# Patient Record
Sex: FEMALE | Race: WHITE | NOT HISPANIC OR LATINO | Employment: PART TIME | URBAN - METROPOLITAN AREA
[De-identification: names, ages, dates, MRNs, and addresses within clinical notes are randomized per-mention and may not be internally consistent; named-entity substitution may affect disease eponyms.]

---

## 2023-09-19 ENCOUNTER — TRANSFERRED RECORDS (OUTPATIENT)
Dept: HEALTH INFORMATION MANAGEMENT | Facility: CLINIC | Age: 48
End: 2023-09-19

## 2023-10-06 ENCOUNTER — TRANSFERRED RECORDS (OUTPATIENT)
Dept: HEALTH INFORMATION MANAGEMENT | Facility: CLINIC | Age: 48
End: 2023-10-06

## 2023-10-20 ENCOUNTER — TRANSFERRED RECORDS (OUTPATIENT)
Dept: HEALTH INFORMATION MANAGEMENT | Facility: CLINIC | Age: 48
End: 2023-10-20

## 2023-10-23 ENCOUNTER — MEDICAL CORRESPONDENCE (OUTPATIENT)
Dept: HEALTH INFORMATION MANAGEMENT | Facility: CLINIC | Age: 48
End: 2023-10-23

## 2023-10-24 ENCOUNTER — TELEPHONE (OUTPATIENT)
Dept: ORTHOPEDICS | Facility: CLINIC | Age: 48
End: 2023-10-24

## 2023-10-24 ENCOUNTER — TELEPHONE (OUTPATIENT)
Dept: ORTHOPEDICS | Facility: CLINIC | Age: 48
End: 2023-10-24
Payer: COMMERCIAL

## 2023-10-24 NOTE — TELEPHONE ENCOUNTER
Patient is having STAT MRI of femur w and w/o done at [facility] Wednesday October 25 at 7pm. Referring provider's nurse will push image early Thursday morning.     Valentina Taylor LPN

## 2023-10-24 NOTE — TELEPHONE ENCOUNTER
[facility]    Phone Message    May a detailed message be left on voicemail: no     Reason for Call: Needs appt for lesion left femur/ Dr Bhargav Grossman @ [facility]/ MRI left knee @ [facility]    MRI was done of the left knee where they saw the lesion. Radiologist is recommending further imaging, but they wanted to see what the Ortho Oncology doctor would recommend.    Call Barb MCFARLAND to schedule the appt [phone]    Action Taken: Message routed to:  [facility]    Travel Screening: Not Applicable

## 2023-10-25 ENCOUNTER — TRANSFERRED RECORDS (OUTPATIENT)
Dept: HEALTH INFORMATION MANAGEMENT | Facility: CLINIC | Age: 48
End: 2023-10-25
Payer: COMMERCIAL

## 2023-10-25 LAB
ABO/RH(D): NORMAL
ANTIBODY SCREEN: NEGATIVE
SPECIMEN EXPIRATION DATE: NORMAL

## 2023-10-25 NOTE — TELEPHONE ENCOUNTER
Action October 24, 2023 9:20 PM MT   Action Taken Sent a request for imaging from [facility]      Action October 26, 2023 9:54 AM MT   Action Taken Imaging from [facility] received and called [facility] for the MRI done yesterday. The rep states that the read is not available, just imaging and we confirmed that the results will not be ready today. Will have to call back another time for the read.      DIAGNOSIS: Left Distal Femur Osseous Lesion   APPOINTMENT DATE: 10/26/2023   NOTES STATUS DETAILS   OFFICE NOTE from referring provider [facility] 10/06/2023 - michelle Grossman MD - [facility]   OFFICE NOTE from other specialist [facility] 04/15/2015 - Anam Hyman MD - [facility]   DISCHARGE REPORT from the ER [facility] 09/19/2023 -  [facility]   MEDICATION LIST Care Everywhere    LABS     MRI PACS [facility]:  10/25/2023 - LT Femur  [facility]:  10/20/2023, 04/03/2015- LT Knee   CT SCAN PACS [facility]:  09/19/2023 - Left Lower Extremity   XRAYS (IMAGES & REPORTS) PACS [facility]:   10/06/2023 - Left Knee  10/06/2023 - Pelvis     
CONSTITUTIONAL: WD,WN. NAD.  Becomes dramatic and yelling when providers nearby.   SKIN: Normal color and turgor. No rash.    HEAD: NC/AT.  EYES: Conjunctiva clear. EOMI. PERRL.    ENT: Airway patent, OP without erythema, tonsillar swelling or exudate; uvula midline without swelling. No evidence of tongue biting.  Nasal mucosa clear, no rhinorrhea.   RESPIRATORY:  Breathing non-labored. No retractions or accessory muscle use.  Lungs CTA bilat.  CARDIOVASCULAR:  RRR, S1S2. No M/R/G.      GI:  Abdomen soft, nontender.    MSK: Neck supple with painless ROM.  No lower extremity edema or calf tenderness.  No joint swelling or ROM limitation.  NEURO: Alert and oriented x 3; CN II-XII grossly intact. Speech clear. 5/5 strength in all extremities.  Normal balance and gait.

## 2023-10-26 ENCOUNTER — ANESTHESIA EVENT (OUTPATIENT)
Dept: SURGERY | Facility: CLINIC | Age: 48
End: 2023-10-26
Payer: COMMERCIAL

## 2023-10-26 ENCOUNTER — ANCILLARY PROCEDURE (OUTPATIENT)
Dept: GENERAL RADIOLOGY | Facility: CLINIC | Age: 48
End: 2023-10-26
Attending: PHYSICIAN ASSISTANT
Payer: COMMERCIAL

## 2023-10-26 ENCOUNTER — PRE VISIT (OUTPATIENT)
Dept: ORTHOPEDICS | Facility: CLINIC | Age: 48
End: 2023-10-26
Payer: COMMERCIAL

## 2023-10-26 ENCOUNTER — LAB (OUTPATIENT)
Dept: LAB | Facility: CLINIC | Age: 48
End: 2023-10-26
Payer: COMMERCIAL

## 2023-10-26 ENCOUNTER — HOSPITAL ENCOUNTER (OUTPATIENT)
Facility: CLINIC | Age: 48
End: 2023-10-26
Attending: ORTHOPAEDIC SURGERY | Admitting: ORTHOPAEDIC SURGERY
Payer: COMMERCIAL

## 2023-10-26 ENCOUNTER — OFFICE VISIT (OUTPATIENT)
Dept: SURGERY | Facility: CLINIC | Age: 48
End: 2023-10-26
Payer: COMMERCIAL

## 2023-10-26 ENCOUNTER — OFFICE VISIT (OUTPATIENT)
Dept: ORTHOPEDICS | Facility: CLINIC | Age: 48
End: 2023-10-26
Payer: COMMERCIAL

## 2023-10-26 VITALS
DIASTOLIC BLOOD PRESSURE: 77 MMHG | SYSTOLIC BLOOD PRESSURE: 119 MMHG | TEMPERATURE: 98 F | RESPIRATION RATE: 16 BRPM | OXYGEN SATURATION: 100 % | BODY MASS INDEX: 32.14 KG/M2 | HEIGHT: 63 IN | HEART RATE: 91 BPM | WEIGHT: 181.4 LBS

## 2023-10-26 DIAGNOSIS — M85.60 BONE CYST: Primary | ICD-10-CM

## 2023-10-26 DIAGNOSIS — M85.60 BONE CYST: ICD-10-CM

## 2023-10-26 DIAGNOSIS — Z01.818 PREOP EXAMINATION: Primary | ICD-10-CM

## 2023-10-26 DIAGNOSIS — I10 PRIMARY HYPERTENSION: ICD-10-CM

## 2023-10-26 LAB
ALBUMIN SERPL BCG-MCNC: 4.1 G/DL (ref 3.5–5.2)
ALP SERPL-CCNC: 110 U/L (ref 35–104)
ALT SERPL W P-5'-P-CCNC: 16 U/L (ref 0–50)
ANION GAP SERPL CALCULATED.3IONS-SCNC: 18 MMOL/L (ref 7–15)
AST SERPL W P-5'-P-CCNC: 25 U/L (ref 0–45)
BASOPHILS # BLD AUTO: 0 10E3/UL (ref 0–0.2)
BASOPHILS NFR BLD AUTO: 0 %
BILIRUB SERPL-MCNC: 0.4 MG/DL
BUN SERPL-MCNC: 14.1 MG/DL (ref 6–20)
CALCIUM SERPL-MCNC: 10.1 MG/DL (ref 8.6–10)
CHLORIDE SERPL-SCNC: 90 MMOL/L (ref 98–107)
CREAT SERPL-MCNC: 0.73 MG/DL (ref 0.51–0.95)
CRP SERPL-MCNC: 75.6 MG/L
DEPRECATED HCO3 PLAS-SCNC: 25 MMOL/L (ref 22–29)
EGFRCR SERPLBLD CKD-EPI 2021: >90 ML/MIN/1.73M2
EOSINOPHIL # BLD AUTO: 0 10E3/UL (ref 0–0.7)
EOSINOPHIL NFR BLD AUTO: 0 %
ERYTHROCYTE [DISTWIDTH] IN BLOOD BY AUTOMATED COUNT: 13.4 % (ref 10–15)
ERYTHROCYTE [SEDIMENTATION RATE] IN BLOOD BY WESTERGREN METHOD: 100 MM/HR (ref 0–20)
GLUCOSE SERPL-MCNC: 95 MG/DL (ref 70–99)
HCT VFR BLD AUTO: 30.2 % (ref 35–47)
HGB BLD-MCNC: 10.2 G/DL (ref 11.7–15.7)
IMM GRANULOCYTES # BLD: 0 10E3/UL
IMM GRANULOCYTES NFR BLD: 0 %
LYMPHOCYTES # BLD AUTO: 1.5 10E3/UL (ref 0.8–5.3)
LYMPHOCYTES NFR BLD AUTO: 19 %
MCH RBC QN AUTO: 30.3 PG (ref 26.5–33)
MCHC RBC AUTO-ENTMCNC: 33.8 G/DL (ref 31.5–36.5)
MCV RBC AUTO: 90 FL (ref 78–100)
MONOCYTES # BLD AUTO: 0.7 10E3/UL (ref 0–1.3)
MONOCYTES NFR BLD AUTO: 9 %
NEUTROPHILS # BLD AUTO: 5.6 10E3/UL (ref 1.6–8.3)
NEUTROPHILS NFR BLD AUTO: 72 %
NRBC # BLD AUTO: 0 10E3/UL
NRBC BLD AUTO-RTO: 0 /100
PLATELET # BLD AUTO: 460 10E3/UL (ref 150–450)
POTASSIUM SERPL-SCNC: 3.3 MMOL/L (ref 3.4–5.3)
PROT SERPL-MCNC: 8.4 G/DL (ref 6.4–8.3)
RBC # BLD AUTO: 3.37 10E6/UL (ref 3.8–5.2)
SODIUM SERPL-SCNC: 133 MMOL/L (ref 135–145)
WBC # BLD AUTO: 7.9 10E3/UL (ref 4–11)

## 2023-10-26 PROCEDURE — 99205 OFFICE O/P NEW HI 60 MIN: CPT | Performed by: PHYSICIAN ASSISTANT

## 2023-10-26 PROCEDURE — 85025 COMPLETE CBC W/AUTO DIFF WBC: CPT | Performed by: PATHOLOGY

## 2023-10-26 PROCEDURE — 86850 RBC ANTIBODY SCREEN: CPT | Performed by: NURSE PRACTITIONER

## 2023-10-26 PROCEDURE — 99000 SPECIMEN HANDLING OFFICE-LAB: CPT | Performed by: PATHOLOGY

## 2023-10-26 PROCEDURE — 80053 COMPREHEN METABOLIC PANEL: CPT | Performed by: PATHOLOGY

## 2023-10-26 PROCEDURE — 71046 X-RAY EXAM CHEST 2 VIEWS: CPT | Mod: GC | Performed by: RADIOLOGY

## 2023-10-26 PROCEDURE — 85652 RBC SED RATE AUTOMATED: CPT | Performed by: PATHOLOGY

## 2023-10-26 PROCEDURE — 86901 BLOOD TYPING SEROLOGIC RH(D): CPT | Performed by: NURSE PRACTITIONER

## 2023-10-26 PROCEDURE — 36415 COLL VENOUS BLD VENIPUNCTURE: CPT | Performed by: PATHOLOGY

## 2023-10-26 PROCEDURE — 83521 IG LIGHT CHAINS FREE EACH: CPT | Mod: 59 | Performed by: PHYSICIAN ASSISTANT

## 2023-10-26 PROCEDURE — 84165 PROTEIN E-PHORESIS SERUM: CPT | Mod: 26 | Performed by: PATHOLOGY

## 2023-10-26 PROCEDURE — 99204 OFFICE O/P NEW MOD 45 MIN: CPT | Performed by: NURSE PRACTITIONER

## 2023-10-26 PROCEDURE — 84165 PROTEIN E-PHORESIS SERUM: CPT | Mod: TC | Performed by: PATHOLOGY

## 2023-10-26 PROCEDURE — 84155 ASSAY OF PROTEIN SERUM: CPT | Performed by: PHYSICIAN ASSISTANT

## 2023-10-26 PROCEDURE — 86140 C-REACTIVE PROTEIN: CPT | Performed by: PATHOLOGY

## 2023-10-26 RX ORDER — LISINOPRIL/HYDROCHLOROTHIAZIDE 10-12.5 MG
1 TABLET ORAL EVERY MORNING
COMMUNITY
Start: 2023-06-21

## 2023-10-26 RX ORDER — ACETAMINOPHEN 500 MG
500-1000 TABLET ORAL EVERY 6 HOURS PRN
COMMUNITY

## 2023-10-26 RX ORDER — TRAZODONE HYDROCHLORIDE 50 MG/1
50 TABLET, FILM COATED ORAL AT BEDTIME
COMMUNITY
Start: 2023-10-25

## 2023-10-26 RX ORDER — ONDANSETRON 4 MG/1
4 TABLET, ORALLY DISINTEGRATING ORAL EVERY 8 HOURS PRN
COMMUNITY
Start: 2023-10-25

## 2023-10-26 RX ORDER — NICOTINE POLACRILEX 4 MG/1
20 GUM, CHEWING ORAL
COMMUNITY
Start: 2023-10-25

## 2023-10-26 RX ORDER — IBUPROFEN 200 MG
200 TABLET ORAL EVERY 4 HOURS PRN
COMMUNITY

## 2023-10-26 RX ORDER — OXYCODONE HYDROCHLORIDE 5 MG/1
5 TABLET ORAL EVERY 6 HOURS PRN
COMMUNITY
Start: 2023-10-25

## 2023-10-26 ASSESSMENT — PAIN SCALES - GENERAL: PAINLEVEL: SEVERE PAIN (6)

## 2023-10-26 NOTE — PATIENT INSTRUCTIONS
Preparing for Your Surgery      Name:  Tacho Lazar   MRN:  [id number]   :  1975   Today's Date:  10/26/2023       Arriving for surgery:  Surgery date:  10/30/2023  Arrival time:  9:00 am    Please come to:     Please come to:      [facility] [address]  [address] [place]  [address]  The Green Ramp for patients and visitors is located beneath the [facility]. The parking facility entrance is at the intersection of [address] [place] [address] and [address]. Patients and visitors who self-park will receive the reduced hospital parking rate (no ticket validation needed).  [employer] parking, located at the [facility] main entrance on [address] [place] [address], is available Monday - Friday from 7 am to 3:30 pm.  Discounted parking pass options can be purchased from  attendants during business hours.  -Check in at the security desk in the [facility] ([facility]) Lobby. They will direct you to the correct elevators.  -Proceed to the 3rd floor, check in at the Adult Surgery Waiting Lounge. [phone]  If you are in need of directions, a wheelchair or escort please stop at the Information Desk in the lobby.  Inform the information person that you are here for surgery; a wheelchair and escort to [address] will be provided.   An escort to the Adult Surgery Waiting Lounge will be provided.    What can I eat or drink?  -  You may eat and drink normally up to 8 hours prior to arrival time. (Until Midnight)  -  You may have clear liquids until 2 hours prior to arrival time. (Until 7 am)    Examples of clear liquids:  Water  Clear broth  Juices (apple, white grape, white cranberry  and cider) without pulp  Noncarbonated, powder based beverages  (lemonade and Rupesh-Aid)  Sodas (Sprite, 7-Up, ginger ale and seltzer)  Coffee or tea (without milk or cream)  Gatorade    -  No Alcohol or cannabis  products for at least 24 hours before surgery.     Which medicines can I take?    Hold Aspirin for 7 days before surgery.   Hold Multivitamins for 7 days before surgery.  Hold Supplements for 7 days before surgery.  Hold Ibuprofen (Advil, Motrin) for 3 day(s) before surgery--unless otherwise directed by surgeon.  Hold Naproxen (Aleve) for 4 days before surgery.    -  DO NOT take these medications the day of surgery:  Lisinopril       -  PLEASE TAKE these medications the day of surgery:  Omeprazole  Acetaminophen (Tylenol) if needed  Ondansetron if needed  Oxycodone if needed       How do I prepare myself?  - Please take 2 showers (one the night prior to surgery and one the morning of surgery) using Scrubcare or Hibiclens soap.    Use this soap only from the neck to your toes.     Leave the soap on your skin for one minute--then rinse thoroughly.      You may use your own shampoo and conditioner. No other hair products.   - Please remove all jewelry and body piercings.  - No lotions, deodorants or fragrance.  - No makeup or fingernail polish.   - Bring your ID and insurance card.    -If you have a Deep Brain Stimulator, Spinal Cord Stimulator, or any Neuro Stimulator device---you must bring the remote control to the hospital.      ALL PATIENTS GOING HOME THE SAME DAY OF SURGERY ARE REQUIRED TO HAVE A RESPONSIBLE ADULT TO DRIVE AND BE IN ATTENDANCE WITH THEM FOR 24 HOURS FOLLOWING SURGERY.    Covid testing policy as of 12/06/2022  Your surgeon will notify and schedule you for a COVID test if one is needed before surgery--please direct any questions or COVID symptoms to your surgeon      Questions or Concerns:    - For any questions regarding the day of surgery or your hospital stay, please contact the Pre Admission Nursing Office at [phone].       - If you have health changes between today and your surgery, please call your surgeon.       - For questions after surgery, please call your surgeons office.            Current Visitor Guidelines    You may have 2 visitors in the pre op area.    Visiting hours: 8 a.m. to 8:30 p.m.    You may have four visitors during your inpatient hospital stay.    Patients confirmed or suspected to have symptoms of COVID 19 or flu:     No visitors allowed for adult patients.   Children (under age 18) can have 1 named visitor.     People who are sick or showing symptoms of COVID 19 or flu:    Are not allowed to visit patients--we can only make exceptions in special situations.       Please follow these guidelines for your visit:          Please maintain social distance          Masking is optional--however at times you may be asked to wear a mask for the safety of yourself and others     Clean your hands with alcohol hand . Do this when you arrive at and leave the building and patient room,    And again after you touch your mask or anything in the room.     Go directly to and from the room you are visiting.     Stay in the patient s room during your visit. Limit going to other places in the hospital as much as possible     Leave bags and jackets at home or in the car.     For everyone s health, please don t come and go during your visit. That includes for smoking   during your visit.

## 2023-10-26 NOTE — NURSING NOTE
Pre-Op Teaching was done in person at the clinic.    Teaching Flowsheet   Relevant Diagnosis: L distal femur biopsy and possible plating  Teaching Topic: Pre-Operative Teaching     Person(s) involved in teaching:   Patient and      Motivation Level:  Asks Questions: Yes  Eager to Learn: Yes  Cooperative: Yes  Receptive (willing/able to accept information): Yes  Any cultural factors/Faith beliefs that may influence understanding or compliance? No     Patient demonstrates understanding of the following:  Reason for the appointment, diagnosis and treatment plan: Yes  Knowledge of proper use of medications and conditions for which they are ordered (with special attention to potential side effects or drug interactions): Yes  Which situations necessitate calling provider and whom to contact: Yes- discussed the stoplight tool to help assist with this.      Teaching Concerns Addressed:      Proper use of surgical scrub explain and provided to patient: Yes  Nutritional needs and diet plan: Yes  Pain management techniques: Yes  Wound Care: Yes  How and/when to access community resources: Yes     Instructional Materials Used/Given: surgical soap  received in clinic.       - Important contact info/ phone numbers  - Map/ location of surgery  - Showering instructions  - Stop light tool    Additionally the following was discussed with patient:  - Patient's , Jai, will drive her home and stay with her for 24 hours after surgery.   - Authorization to Share Protected Health Information- Person to person communication signed by patient and sent to be scanned into chart. Patient authorized the following person or people:   Jai Lazar ([phone])       -Next step: Surgery scheduled 10/30. Pre-op H&P scheduled with PAC 10/26 at 3:30.    Time spent with patient: 15 minutes.     Tara Holter, RNCC

## 2023-10-26 NOTE — NURSING NOTE
Chief Complaint   Patient presents with    Consult     Left distal femur lesion referred by LAURA Brooks // first noticed pain early September        47 year old  1975         Pain Assessment  Patient Currently in Pain: Yes  0-10 Pain Scale: 3 (can get up to 7)  Primary Pain Location:  (left thigh)          [facility] [address] [place] [address]        No Known Allergies        Current Outpatient Medications   Medication    acetaminophen (TYLENOL) 500 MG tablet    ibuprofen (ADVIL/MOTRIN) 200 MG tablet    lisinopril-hydrochlorothiazide (ZESTORETIC) 10-12.5 MG tablet    omeprazole 20 MG tablet    ondansetron (ZOFRAN ODT) 4 MG ODT tab    oxyCODONE (ROXICODONE) 5 MG tablet    traZODone (DESYREL) 50 MG tablet     No current facility-administered medications for this visit.

## 2023-10-26 NOTE — PROGRESS NOTES
Chief Complaint: Left thigh pain, left femur lesion    History: Tacho is a 47-year-old female here with her  today for further evaluation and treatment of left thigh pain and left femur bone abnormality.  Patient reports that this summer, she noticed some left thigh pain and tightness without injury.  She did not notice any swelling or redness.  In August she felt very fatigued.  She assumed she had mono, which one of her children had.  She was getting pain at night in the thigh and it was hard to sleep.  She has been alternating Tylenol and ibuprofen every 3 hours which seems to help.  At some point, that was not helping her pain, so she was sent to the ER for Doppler ultrasound, which was negative.  She was then referred to orthopedics for further work-up.  She had an x-ray of the knee which was read as normal.  She was sent to physical therapy with no relief.  An MRI of the knee was ordered which showed an abnormality in the bone marrow of the left distal femur.  Yesterday she had a left femur MRI which did partially include both femurs on the scan.  She was referred here for further evaluation.  She was just told that there was an abnormality in the bone.  Patient reports that over the last 6 weeks she has had nausea and vomiting.  Just recently she was given Zofran by her PCP, which has been helpful for her.  She has had a significant loss of appetite and weight loss.  She denies any night sweats, but does states she has had a low-grade fever of 100 degrees off-and-on over the last several weeks.  Her only medical history is hypertension that is controlled with low-dose medication.  She is a non-smoker.  She works as a .  She is  with 3 children.  She has a couple family members who are physicians.  She has also set up a consult with the [facility] next week if needed.  No other concerns.  Patient reports no pain in the right thigh or lower extremities.  She does occasionally  have left upper arm pain.    PastMedHx: HTN    PastSurgHx: none    FamHx: none    Social History     Tobacco Use    Smoking status: Not on file    Smokeless tobacco: Not on file   Substance Use Topics    Alcohol use: Not on file     Meds:   Current Outpatient Medications   Medication    acetaminophen (TYLENOL) 500 MG tablet    ibuprofen (ADVIL/MOTRIN) 200 MG tablet    lisinopril-hydrochlorothiazide (ZESTORETIC) 10-12.5 MG tablet    omeprazole 20 MG tablet    ondansetron (ZOFRAN ODT) 4 MG ODT tab    oxyCODONE (ROXICODONE) 5 MG tablet    traZODone (DESYREL) 50 MG tablet     No current facility-administered medications for this visit.       Allergies:  No Known Allergies    Review of Systems:   ROS: 10 point ROS neg other than the symptoms noted above in the HPI.    Physical Exam: There were no vitals taken for this visit.  Tacho is a 47-year-old female who is alert and oriented no apparent distress.  She has a minimally antalgic gait without gait assistance.  She has diffuse deep soreness of the left thigh.  No obvious swelling or mass.  She has full range of motion of the hip and knee today.  She is neurovascular intact distally.  No edema or calf pain    Imaging: Outside images were reviewed including MRI of the left knee and MRI of the left femur with and without contrast, partially including the right femur as well shows: Multiple enhancing lesions within the proximal and distal femurs bilaterally with no sign of pathologic fracture or cortical breakthrough.  There is scattered periosteal edema around the femurs.  Partially visualized osseous lesions also within the proximal tibias bilaterally.    AP and lateral chest x-ray was ordered and obtained today.  This shows: 1. Suspicious right lower lung mass measuring up to 4.1 cm with a possible left scapular lytic lesion.    Impression: 47-year-old female with multiple bony lesions and systemic symptoms, concerning for malignancy    Plan: We told the patient and her   that we do have some concern she may have a malignancy.  It is also possible that she has a systemic infection.  We would like to get some baseline lab work including CBC, CMP, CRP, sed rate, kappa and lambda light chain and serum protein electrophoresis.  We will also schedule her for a biopsy of the left distal femur lesion on Monday.  She will be seen by the PAC clinic for clearance.  She should stop her ibuprofen today.  She can take Tylenol as needed for pain.  There is a slight chance that she would need plate and screw fixation if we have to make a larger hole in the bone.  This will be an outpatient procedure.  We should have preliminary results the day of surgery and final results within 4 to 5 days.  She will be presented at our tumor conference as well.  She understands and agrees with this plan.  All questions answered.  Patient was also examined by Dr. Dougherty, and he agrees with the plan of care.

## 2023-10-26 NOTE — LETTER
10/26/2023         RE: Tacho Lazar  [address]  [facility] [address]        Dear Colleague,    Thank you for referring your patient, Tacho Lazar, to the [facility] [place]. Please see a copy of my visit note below.    Chief Complaint: Left thigh pain, left femur lesion    History: Tacho is a 47-year-old female here with her  today for further evaluation and treatment of left thigh pain and left femur bone abnormality.  Patient reports that this summer, she noticed some left thigh pain and tightness without injury.  She did not notice any swelling or redness.  In August she felt very fatigued.  She assumed she had mono, which one of her children had.  She was getting pain at night in the thigh and it was hard to sleep.  She has been alternating Tylenol and ibuprofen every 3 hours which seems to help.  At some point, that was not helping her pain, so she was sent to the ER for Doppler ultrasound, which was negative.  She was then referred to orthopedics for further work-up.  She had an x-ray of the knee which was read as normal.  She was sent to physical therapy with no relief.  An MRI of the knee was ordered which showed an abnormality in the bone marrow of the left distal femur.  Yesterday she had a left femur MRI which did partially include both femurs on the scan.  She was referred here for further evaluation.  She was just told that there was an abnormality in the bone.  Patient reports that over the last 6 weeks she has had nausea and vomiting.  Just recently she was given Zofran by her PCP, which has been helpful for her.  She has had a significant loss of appetite and weight loss.  She denies any night sweats, but does states she has had a low-grade fever of 100 degrees off-and-on over the last several weeks.  Her only medical history is hypertension that is controlled with low-dose medication.  She is a non-smoker.  She works as a .  She is   with 3 children.  She has a couple family members who are physicians.  She has also set up a consult with the [facility] next week if needed.  No other concerns.  Patient reports no pain in the right thigh or lower extremities.  She does occasionally have left upper arm pain.    PastMedHx: HTN    PastSurgHx: none    FamHx: none    Social History     Tobacco Use    Smoking status: Not on file    Smokeless tobacco: Not on file   Substance Use Topics    Alcohol use: Not on file     Meds:   Current Outpatient Medications   Medication    acetaminophen (TYLENOL) 500 MG tablet    ibuprofen (ADVIL/MOTRIN) 200 MG tablet    lisinopril-hydrochlorothiazide (ZESTORETIC) 10-12.5 MG tablet    omeprazole 20 MG tablet    ondansetron (ZOFRAN ODT) 4 MG ODT tab    oxyCODONE (ROXICODONE) 5 MG tablet    traZODone (DESYREL) 50 MG tablet     No current facility-administered medications for this visit.       Allergies:  No Known Allergies    Review of Systems:   ROS: 10 point ROS neg other than the symptoms noted above in the HPI.    Physical Exam: There were no vitals taken for this visit.  Tacho is a 47-year-old female who is alert and oriented no apparent distress.  She has a minimally antalgic gait without gait assistance.  She has diffuse deep soreness of the left thigh.  No obvious swelling or mass.  She has full range of motion of the hip and knee today.  She is neurovascular intact distally.  No edema or calf pain    Imaging: Outside images were reviewed including MRI of the left knee and MRI of the left femur with and without contrast, partially including the right femur as well shows: Multiple enhancing lesions within the proximal and distal femurs bilaterally with no sign of pathologic fracture or cortical breakthrough.  There is scattered periosteal edema around the femurs.  Partially visualized osseous lesions also within the proximal tibias bilaterally.    AP and lateral chest x-ray was ordered and obtained today.   This shows: 1. Suspicious right lower lung mass measuring up to 4.1 cm with a possible left scapular lytic lesion.    Impression: 47-year-old female with multiple bony lesions and systemic symptoms, concerning for malignancy    Plan: We told the patient and her  that we do have some concern she may have a malignancy.  It is also possible that she has a systemic infection.  We would like to get some baseline lab work including CBC, CMP, CRP, sed rate, kappa and lambda light chain and serum protein electrophoresis.  We will also schedule her for a biopsy of the left distal femur lesion on Monday.  She will be seen by the PAC clinic for clearance.  She should stop her ibuprofen today.  She can take Tylenol as needed for pain.  There is a slight chance that she would need plate and screw fixation if we have to make a larger hole in the bone.  This will be an outpatient procedure.  We should have preliminary results the day of surgery and final results within 4 to 5 days.  She will be presented at our tumor conference as well.  She understands and agrees with this plan.  All questions answered.  Patient was also examined by Dr. Dougherty, and he agrees with the plan of care.        Jeremie Dougherty MD

## 2023-10-26 NOTE — PATIENT INSTRUCTIONS
Assessment: Multiple skeletal lesions with a right lung nodule.  Biopsies are indicated.     Plan:  1.  Surgical biopsy and possible plating on Monday.  2.  Blood work has been ordered for today.    3.  Image guided biopsy of the right lung mass has been requested but can always be canceled if not necessary.

## 2023-10-26 NOTE — H&P
Pre-Operative H & P     CC:  Preoperative exam to assess for increased cardiopulmonary risk while undergoing surgery and anesthesia.    Date of Encounter: 10/26/2023  Primary Care Physician:  Aaliyah Lyon     Reason for visit:   Encounter Diagnoses   Name Primary?    Preop examination Yes    Primary hypertension        HPI  Tacho Lazar is a 47 year old female who presents for pre-operative H & P in preparation for  Procedure Information       Case: [id number] Date/Time: 10/30/23 1130    Procedures:       biopsy left distal femur. (Left: Leg)      possible plating (Left: Leg)    Anesthesia type: General    Diagnosis: Bone cyst [M85.60]    Pre-op diagnosis: Bone cyst [M85.60]    [facility]: [facility]    Providers: Jeremie Dougherty MD          The patient was seen by Dr. Dougherty in orthopedic surgery consultation today for further evaluation of osseous lesions within the distal femur-- concerning for malignancy. Through Dr. Dougherty's evaluation, he counseled the patient on the findings The above procedure has been scheduled for diagnosis.       The patient presents to the PAC in person today with her , Jai, in preparation for the above scheduled procedure with comorbid conditions including          History is obtained from the patient and chart review    Hx of abnormal bleeding or anti-platelet use: denies     Menstrual history: Patient's last menstrual period was 09/01/2023 (within days).:      Past Medical History  Past Medical History:   Diagnosis Date    Bone cyst of femur     Essential hypertension        Past Surgical History  Past Surgical History:   Procedure Laterality Date    ENT SURGERY      [place] teeth extracted.       Prior to Admission Medications  Current Outpatient Medications   Medication Sig Dispense Refill    acetaminophen (TYLENOL) 500 MG tablet Take 500-1,000 mg by mouth every 6 hours as needed for mild pain      ibuprofen (ADVIL/MOTRIN) 200 MG tablet Take 200 mg by mouth  every 4 hours as needed for pain      lisinopril-hydrochlorothiazide (ZESTORETIC) 10-12.5 MG tablet Take 1 tablet by mouth every morning      ondansetron (ZOFRAN ODT) 4 MG ODT tab Place 4 mg under the tongue every 8 hours as needed      oxyCODONE (ROXICODONE) 5 MG tablet Take 5 mg by mouth every 6 hours as needed      traZODone (DESYREL) 50 MG tablet Take 50 mg by mouth at bedtime      omeprazole 20 MG tablet Take 20 mg by mouth (Patient not taking: Reported on 10/26/2023)         Allergies  No Known Allergies    Social History  Social History     Socioeconomic History    Marital status:      Spouse name: Not on file    Number of children: Not on file    Years of education: Not on file    Highest education level: Not on file   Occupational History    Not on file   Tobacco Use    Smoking status: Never    Smokeless tobacco: Never   Substance and Sexual Activity    Alcohol use: Yes     Comment: Social    Drug use: Not Currently    Sexual activity: Not on file   Other Topics Concern    Not on file   Social History Narrative    Not on file     Social Determinants of Health     Financial Resource Strain: Not on file   Food Insecurity: Not on file   Transportation Needs: Not on file   Physical Activity: Not on file   Stress: Not on file   Social Connections: Not on file   Interpersonal Safety: Not on file   Housing Stability: Not on file       Family History  No family history on file.    Review of Systems  The complete review of systems is negative other than noted in the HPI or here.   Anesthesia Evaluation   Pt has had prior anesthetic.     No history of anesthetic complications ([place] teeth extraction.)       ROS/MED HX  ENT/Pulmonary:  - neg pulmonary ROS     Neurologic:  - neg neurologic ROS     Cardiovascular: Comment: Denies cardiac symptoms including chest pain, SOB, palpitations, syncope, [place], orthopnea, or PND.          METS/Exercise Tolerance: >4 METS Comment: .  Busy Mom of three  "children ages 17, 15 and 12.    Hematologic:  - neg hematologic  ROS     Musculoskeletal: Comment: Pain in left leg treated with tylenol and ibuprofen.       GI/Hepatic:     (+) GERD, Asymptomatic on medication,                  Renal/Genitourinary:  - neg Renal ROS     Endo: Comment: Weight loss of ~15 pounds over 2 months.       Psychiatric/Substance Use:  - neg psychiatric ROS     Infectious Disease:  - neg infectious disease ROS     Malignancy:  - neg malignancy ROS     Other:      (+)  LMP: 9/1/2023, ,         /77 (BP Location: Right arm, Patient Position: Sitting, Cuff Size: Adult Regular)   Pulse 91   Temp 98  F (36.7  C) (Oral)   Resp 16   Ht 1.603 m (5' 3.11\")   Wt 82.3 kg (181 lb 6.4 oz)   LMP 09/01/2023 (Within Days)   SpO2 100%   Breastfeeding No   BMI 32.02 kg/m      Physical Exam   Constitutional: Awake, alert, cooperative, weepy intermittently and appears stated age.  Eyes: Pupils equal, round and reactive to light, extra ocular muscles intact, sclera clear, conjunctiva normal.  HENT: Normocephalic, oral pharynx with moist mucus membranes, good dentition. No goiter appreciated.   Respiratory: Clear to auscultation bilaterally, no crackles or wheezing.  Cardiovascular: Regular rate and rhythm, normal S1 and S2, and no murmur noted.  Carotids +2, no bruits. No edema. Palpable pulses to radial  DP and PT arteries.   GI: Normal bowel sounds, soft, non-distended, non-tender, no masses palpated, no hepatosplenomegaly.    Lymph/Hematologic: No cervical lymphadenopathy and no supraclavicular lymphadenopathy.  Skin: Warm and dry.  No rashes at anticipated surgical site.   Musculoskeletal: Full ROM of neck. There is no redness, warmth, or swelling of the joints. Gross motor strength is normal.    Neurologic: Awake, alert, oriented to name, place and time. Cranial nerves II-XII are grossly intact. Gait is normal.   Neuropsychiatric: Calm, cooperative. Normal affect.     Prior Labs/Diagnostic " Studies   All labs and imaging personally reviewed   BASIC METABOLIC PANEL  Specimen: Blood - Blood specimen (specimen)  Component  Ref Range & Units 4 mo ago Comments   SODIUM  136 - 145 mmol/L 138    POTASSIUM  3.5 - 5.1 mmol/L 4.1    CHLORIDE  98 - 107 mmol/L 102    CO2,TOTAL  22 - 29 mmol/L 25    ANION GAP  5 - 18 11    GLUCOSE  70 - 99 mg/dL 99    CALCIUM  8.6 - 10.0 mg/dL 9.2    BUN  6 - 20 mg/dL 11    CREATININE  0.50 - 0.90 mg/dL 0.79    BUN/CREAT RATIO  10 - 20 14    eGFR  >90 mL/min/1.73m2 >90 As of 03/15/2022, eGFR is calculated by the CKD-EPI creatinine equation without race adjustment.  eGFR can be influenced by muscle mass, exercise, and diet.  The reported eGFR is an estimation only and is only applicable if the renal function is stable.   Resulting [facility] LABORATORY-CENTRAL LABORATORY    Specimen Collected: 06/21/23 11:51 AM     HEMOGLOBIN  Specimen: Blood - Blood specimen (specimen)  Component  Ref Range & Units 4 mo ago   HEMOGLOBIN                12.0 - 16.0 g/dL 13.0   MCV                        80 - 100 fL 93   Resulting [facility]   Specimen Collected: 06/21/23 11:51 AM     FERRITIN  Specimen: Blood - Blood specimen (specimen)  Component  Ref Range & Units 4 mo ago   FERRITIN  15.0 - 150.0 ng/mL 28.3   Resulting [facility] LABORATORY-CENTRAL LABORATORY   Specimen Collected: 06/21/23 11:51 AM     LAB/DIAGNOSTIC STUDIES TODAY:     Latest Reference Range & Units 10/26/23 16:21   Sodium 135 - 145 mmol/L 133 (L)   Potassium 3.4 - 5.3 mmol/L 3.3 (L)   Chloride 98 - 107 mmol/L 90 (L)   Carbon Dioxide (CO2) 22 - 29 mmol/L 25   Urea Nitrogen 6.0 - 20.0 mg/dL 14.1   Creatinine 0.51 - 0.95 mg/dL 0.73   GFR Estimate >60 mL/min/1.73m2 >90   Calcium 8.6 - 10.0 mg/dL 10.1 (H)   Anion Gap 7 - 15 mmol/L 18 (H)   Albumin 3.5 - 5.2 g/dL 4.1   Protein Total 6.4 - 8.3 g/dL 8.4 (H)   Alkaline Phosphatase 35 - 104 U/L 110 (H)   ALT 0 - 50 U/L 16   AST 0 - 45 U/L 25    Bilirubin Total <=1.2 mg/dL 0.4   CRP Inflammation <5.00 mg/L 75.60 (H)   Glucose 70 - 99 mg/dL 95   WBC 4.0 - 11.0 10e3/uL 7.9   Hemoglobin 11.7 - 15.7 g/dL 10.2 (L)   Hematocrit 35.0 - 47.0 % 30.2 (L)   Platelet Count 150 - 450 10e3/uL 460 (H)   RBC Count 3.80 - 5.20 10e6/uL 3.37 (L)   MCV 78 - 100 fL 90   MCH 26.5 - 33.0 pg 30.3   MCHC 31.5 - 36.5 g/dL 33.8   RDW 10.0 - 15.0 % 13.4   % Neutrophils % 72   % Lymphocytes % 19   % Monocytes % 9   % Eosinophils % 0   % Basophils % 0   Absolute Basophils 0.0 - 0.2 10e3/uL 0.0   Absolute Eosinophils 0.0 - 0.7 10e3/uL 0.0   Absolute Immature Granulocytes <=0.4 10e3/uL 0.0   Absolute Lymphocytes 0.8 - 5.3 10e3/uL 1.5   Absolute Monocytes 0.0 - 1.3 10e3/uL 0.7   % Immature Granulocytes % 0   Absolute Neutrophils 1.6 - 8.3 10e3/uL 5.6   Absolute NRBCs 10e3/uL 0.0   NRBCs per 100 WBC <1 /100 0   Sed Rate 0 - 20 mm/hr 100 (H)   ABO/Rh(D)  O NEG   Antibody Screen Negative  Negative   SPECIMEN EXPIRATION DATE  [id number]   (L): Data is abnormally low  (H): Data is abnormally high    PROCEDURES   MRI 10/20/23  Osseous lesions within the distal femur-- concerning for malignancy.   Please see EMR for further details.        The patient's records and results personally reviewed by this provider.     Outside records reviewed from: Care Everywhere    Assessment    Tacho Lazar is a 47 year old female seen as a PAC referral for risk assessment and optimization for anesthesia.    Plan/Recommendations  Pt will be optimized for the proposed procedure.  See below for details on the assessment, risk, and preoperative recommendations    NEUROLOGY  - No history of TIA, CVA or seizure    - Chronic Pain of left thigh   ~ alternating acetaminophen and ibuprofen    ~ recently prescribed oxycodone and took one at bedtime earlier this week.  Did not find it to be effective.    ~ morphine equivilant = 30 MME/Day     -Post Op delirium risk factors:  No risk identified    ENT  - No current  "airway concerns.  Will need to be reassessed day of surgery.  Mallampati: II  TM: > 3    CARDIAC  - No history of CAD and Afib. Denies cardiac symptoms    - HTN   ~ managed with lisinopril-hydrochlorothiazide combination   ~ hold ACE-I/thiazide diuretic combination morning of surgery.     - METS (Metabolic Equivalents)  Patient performs 4 or more METS exercise without symptoms            Total Score: 0      - RCRI-Very low risk: Class 1 0.4% complication rate            Total Score: 0        PULMONARY  - SUNIL Low Risk            Total Score: 1    SUNIL: Hypertension      - Denies asthma or inhaler use  - Tobacco History    History   Smoking Status    Never   Smokeless Tobacco    Never       GI  - GERD   ~ Controlled on medications: Proton Pump Inhibitor    - Constipation   ~ Miralax    - Nausea and vomiting, unspecified   ~ ondansetron    - PONV High Risk  Total Score: 3           1 AN PONV: Pt is Female    1 AN PONV: Patient is not a current smoker    1 AN PONV: Intended Post Op Opioids        /RENAL  - Baseline Creatinine  see above     ENDOCRINE    - BMI: Estimated body mass index is 32.02 kg/m  as calculated from the following:    Height as of this encounter: 1.603 m (5' 3.11\").    Weight as of this encounter: 82.3 kg (181 lb 6.4 oz).  Obesity (BMI >30)    - No history of Diabetes Mellitus    HEME  - VTE Medium Risk 1.8%            Total Score: 5    VTE: Current cancer      - No history of abnormal bleeding or antiplatelet use.    Hemoglobin   Date Value Ref Range Status   10/26/2023 10.2 (L) 11.7 - 15.7 g/dL Final     - Denies previous blood transfusion    MSK  - osseous lesions within the distal femur  ~ concerning for malignancy  ~ above procedure scheduled for diagnosis and guide treatment options.     PSYCH  - insomnia due to psychological stress/anxiety   ~ PCP prescribed trazodone but patient has not found to be effective   ~ Encouraged to follow closely with PCP for further evaluation and " treatment    Different anesthesia methods/types have been discussed with the patient, but they are aware that the final plan will be decided by the assigned anesthesia provider on the date of service.  Patient was discussed with Dr Harmon    The patient is optimized for their procedure. AVS with information on surgery time/arrival time, meds and NPO status given by nursing staff. No further diagnostic testing indicated.      On the day of service:     Prep time: 7 minutes  Visit time: 19 minutes  Documentation time: 23 minutes  ------------------------------------------  Total time: 49 minutes      FELIX Reilly CNP  Preoperative Assessment Center  [facility]  Phone: [phone]  Fax: [phone]

## 2023-10-27 ENCOUNTER — TELEPHONE (OUTPATIENT)
Dept: ORTHOPEDICS | Facility: CLINIC | Age: 48
End: 2023-10-27
Payer: COMMERCIAL

## 2023-10-27 DIAGNOSIS — M85.60 BONE CYST: ICD-10-CM

## 2023-10-27 LAB
ALBUMIN SERPL ELPH-MCNC: 3.5 G/DL (ref 3.7–5.1)
ALPHA1 GLOB SERPL ELPH-MCNC: 0.6 G/DL (ref 0.2–0.4)
ALPHA2 GLOB SERPL ELPH-MCNC: 1.3 G/DL (ref 0.5–0.9)
B-GLOBULIN SERPL ELPH-MCNC: 1 G/DL (ref 0.6–1)
GAMMA GLOB SERPL ELPH-MCNC: 1.4 G/DL (ref 0.7–1.6)
KAPPA LC FREE SER-MCNC: 4.33 MG/DL (ref 0.33–1.94)
KAPPA LC FREE/LAMBDA FREE SER NEPH: 1.49 {RATIO} (ref 0.26–1.65)
LAMBDA LC FREE SERPL-MCNC: 2.91 MG/DL (ref 0.57–2.63)
M PROTEIN SERPL ELPH-MCNC: 0 G/DL
PROT PATTERN SERPL ELPH-IMP: ABNORMAL
TOTAL PROTEIN SERUM FOR ELP: 7.9 G/DL (ref 6.4–8.3)

## 2023-10-27 NOTE — TELEPHONE ENCOUNTER
Phoned patient to confirm surgery with Dr. Dougherty.   Provided brief reason of call and call back number of [phone].

## 2023-10-27 NOTE — TELEPHONE ENCOUNTER
Spoke to the patient about her lab results.  CMP electrolytes slightly off.  Encourage gatorade and adding salt to foods, continue to hydrate.  Patient has been vomiting for weeks, improved with zofran.  Some abnormalities with multiple myeloma labs.  Biopsy will give us the final answer.  All questions answered.

## 2023-10-27 NOTE — CONSULTS
Outpatient IR Biopsy Referral    Patient is a 46 y/o female with a PMH of bone cyst, multiple bone lesions, systemic sx's; new lung nodule, concerning for malignancy vs infection  IR has been asked to biopsy the right lung lesion found on Chest X ray. Patient has not had an oncology consult for work up. IR has asked for cross sectional imaging, CT w/o contrast chest and oncology referral for complete work up prior to lung biopsy.     10/31/23 update from referring team: Hold Request for lung biopsy due to biopsy today showed metastatic Carcinoma   I ordered Pet/CT, oncology and radiation. We will hold on the lung Bx for now         10/26/23 CT chest     Findings:     PA and lateral views of chest. No cardiomegaly. No pleural effusion or  pneumothorax. Right lower lobe oval mass with an irregular superior  border which measures up to 4.1 cm. Possible lytic area of the  anterior border of the left scapula.    Impression:    1. Suspicious right lower lung mass measuring up to 4.1 cm with a  possible left scapular lytic lesion. Recommend CT chest for better  characterization.    Primary team Chayo Subramanian PAC Ortho Surg made aware of IR recommendations via epic messaging.      Yeimi Duncan DNP APRN  Interventional Radiology   IR on-call pager: [phone]

## 2023-10-27 NOTE — TELEPHONE ENCOUNTER
Patient is scheduled for surgery with Dr. Dougherty    Spoke with: Tacho    Date of Surgery: 10/30/23    [facility]    Informed patient they will need an adult  Yes    Pre op with Provider PAC completed    Additional imaging/appointments: TBD, per case request.     Surgery packet: Received     Additional comments: Requesting c/b to go over lab results.         Ariana Byrne on 10/27/2023 at 10:21 AM

## 2023-10-30 ENCOUNTER — MYC MEDICAL ADVICE (OUTPATIENT)
Dept: ORTHOPEDICS | Facility: CLINIC | Age: 48
End: 2023-10-30

## 2023-10-30 ENCOUNTER — PATIENT OUTREACH (OUTPATIENT)
Dept: ONCOLOGY | Facility: CLINIC | Age: 48
End: 2023-10-30

## 2023-10-30 ENCOUNTER — HOSPITAL ENCOUNTER (OUTPATIENT)
Facility: CLINIC | Age: 48
Discharge: HOME | End: 2023-10-30
Attending: ORTHOPAEDIC SURGERY | Admitting: ORTHOPAEDIC SURGERY
Payer: COMMERCIAL

## 2023-10-30 ENCOUNTER — ANESTHESIA (OUTPATIENT)
Dept: SURGERY | Facility: CLINIC | Age: 48
End: 2023-10-30
Payer: COMMERCIAL

## 2023-10-30 VITALS
HEART RATE: 102 BPM | WEIGHT: 177.69 LBS | RESPIRATION RATE: 14 BRPM | SYSTOLIC BLOOD PRESSURE: 131 MMHG | HEIGHT: 63 IN | OXYGEN SATURATION: 100 % | BODY MASS INDEX: 31.48 KG/M2 | DIASTOLIC BLOOD PRESSURE: 76 MMHG | TEMPERATURE: 98.4 F

## 2023-10-30 DIAGNOSIS — C80.1 METASTATIC CARCINOMA INVOLVING BONE WITH UNKNOWN PRIMARY SITE (H): ICD-10-CM

## 2023-10-30 DIAGNOSIS — C79.51 METASTATIC CARCINOMA INVOLVING BONE WITH UNKNOWN PRIMARY SITE (H): Primary | ICD-10-CM

## 2023-10-30 DIAGNOSIS — C79.51 METASTATIC CARCINOMA INVOLVING BONE WITH UNKNOWN PRIMARY SITE (H): ICD-10-CM

## 2023-10-30 DIAGNOSIS — M85.60 BONE CYST: Primary | ICD-10-CM

## 2023-10-30 DIAGNOSIS — C80.1 METASTATIC CARCINOMA INVOLVING BONE WITH UNKNOWN PRIMARY SITE (H): Primary | ICD-10-CM

## 2023-10-30 PROCEDURE — 360N000083 HC SURGERY LEVEL 3 W/ FLUORO, PER MIN: Performed by: ORTHOPAEDIC SURGERY

## 2023-10-30 PROCEDURE — 88331 PATH CONSLTJ SURG 1 BLK 1SPC: CPT | Mod: TC | Performed by: ORTHOPAEDIC SURGERY

## 2023-10-30 PROCEDURE — 88307 TISSUE EXAM BY PATHOLOGIST: CPT | Mod: 26 | Performed by: PATHOLOGY

## 2023-10-30 PROCEDURE — 250N000011 HC RX IP 250 OP 636: Performed by: NURSE ANESTHETIST, CERTIFIED REGISTERED

## 2023-10-30 PROCEDURE — 999N000141 HC STATISTIC PRE-PROCEDURE NURSING ASSESSMENT: Performed by: ORTHOPAEDIC SURGERY

## 2023-10-30 PROCEDURE — 88275 CYTOGENETICS 100-300: CPT | Performed by: ORTHOPAEDIC SURGERY

## 2023-10-30 PROCEDURE — 258N000003 HC RX IP 258 OP 636: Performed by: NURSE ANESTHETIST, CERTIFIED REGISTERED

## 2023-10-30 PROCEDURE — 250N000011 HC RX IP 250 OP 636: Performed by: PHYSICIAN ASSISTANT

## 2023-10-30 PROCEDURE — 250N000009 HC RX 250: Performed by: NURSE ANESTHETIST, CERTIFIED REGISTERED

## 2023-10-30 PROCEDURE — 250N000011 HC RX IP 250 OP 636: Mod: JZ | Performed by: NURSE ANESTHETIST, CERTIFIED REGISTERED

## 2023-10-30 PROCEDURE — 88341 IMHCHEM/IMCYTCHM EA ADD ANTB: CPT | Mod: 26 | Performed by: PATHOLOGY

## 2023-10-30 PROCEDURE — 272N000001 HC OR GENERAL SUPPLY STERILE: Performed by: ORTHOPAEDIC SURGERY

## 2023-10-30 PROCEDURE — 250N000011 HC RX IP 250 OP 636: Performed by: ANESTHESIOLOGY

## 2023-10-30 PROCEDURE — 250N000013 HC RX MED GY IP 250 OP 250 PS 637: Performed by: ANESTHESIOLOGY

## 2023-10-30 PROCEDURE — 88311 DECALCIFY TISSUE: CPT | Mod: 26 | Performed by: PATHOLOGY

## 2023-10-30 PROCEDURE — 87102 FUNGUS ISOLATION CULTURE: CPT | Performed by: ORTHOPAEDIC SURGERY

## 2023-10-30 PROCEDURE — 710N000009 HC RECOVERY PHASE 1, LEVEL 1, PER MIN: Performed by: ORTHOPAEDIC SURGERY

## 2023-10-30 PROCEDURE — 88342 IMHCHEM/IMCYTCHM 1ST ANTB: CPT | Mod: 26 | Performed by: PATHOLOGY

## 2023-10-30 PROCEDURE — 88331 PATH CONSLTJ SURG 1 BLK 1SPC: CPT | Mod: 26 | Performed by: PATHOLOGY

## 2023-10-30 PROCEDURE — 250N000025 HC SEVOFLURANE, PER MIN: Performed by: ORTHOPAEDIC SURGERY

## 2023-10-30 PROCEDURE — 87075 CULTR BACTERIA EXCEPT BLOOD: CPT | Performed by: ORTHOPAEDIC SURGERY

## 2023-10-30 PROCEDURE — 88311 DECALCIFY TISSUE: CPT | Mod: TC | Performed by: ORTHOPAEDIC SURGERY

## 2023-10-30 PROCEDURE — 370N000017 HC ANESTHESIA TECHNICAL FEE, PER MIN: Performed by: ORTHOPAEDIC SURGERY

## 2023-10-30 PROCEDURE — 87070 CULTURE OTHR SPECIMN AEROBIC: CPT | Performed by: ORTHOPAEDIC SURGERY

## 2023-10-30 PROCEDURE — 710N000012 HC RECOVERY PHASE 2, PER MINUTE: Performed by: ORTHOPAEDIC SURGERY

## 2023-10-30 PROCEDURE — 88368 INSITU HYBRIDIZATION MANUAL: CPT | Mod: 26 | Performed by: PATHOLOGY

## 2023-10-30 PROCEDURE — 87556 M.TUBERCULO DNA AMP PROBE: CPT | Performed by: ORTHOPAEDIC SURGERY

## 2023-10-30 PROCEDURE — 250N000009 HC RX 250: Performed by: ORTHOPAEDIC SURGERY

## 2023-10-30 RX ORDER — PROPOFOL 10 MG/ML
INJECTION, EMULSION INTRAVENOUS PRN
Status: DISCONTINUED | OUTPATIENT
Start: 2023-10-30 | End: 2023-10-30

## 2023-10-30 RX ORDER — ONDANSETRON 4 MG/1
4 TABLET, ORALLY DISINTEGRATING ORAL EVERY 30 MIN PRN
Status: DISCONTINUED | OUTPATIENT
Start: 2023-10-30 | End: 2023-11-03 | Stop reason: HOSPADM

## 2023-10-30 RX ORDER — SODIUM CHLORIDE, SODIUM LACTATE, POTASSIUM CHLORIDE, CALCIUM CHLORIDE 600; 310; 30; 20 MG/100ML; MG/100ML; MG/100ML; MG/100ML
INJECTION, SOLUTION INTRAVENOUS CONTINUOUS PRN
Status: DISCONTINUED | OUTPATIENT
Start: 2023-10-30 | End: 2023-10-30

## 2023-10-30 RX ORDER — CEFAZOLIN SODIUM/WATER 2 G/20 ML
2 SYRINGE (ML) INTRAVENOUS SEE ADMIN INSTRUCTIONS
Status: DISCONTINUED | OUTPATIENT
Start: 2023-10-30 | End: 2023-10-30 | Stop reason: HOSPADM

## 2023-10-30 RX ORDER — PROPOFOL 10 MG/ML
INJECTION, EMULSION INTRAVENOUS CONTINUOUS PRN
Status: DISCONTINUED | OUTPATIENT
Start: 2023-10-30 | End: 2023-10-30

## 2023-10-30 RX ORDER — ACETAMINOPHEN 325 MG/1
975 TABLET ORAL ONCE
Status: COMPLETED | OUTPATIENT
Start: 2023-10-30 | End: 2023-10-30

## 2023-10-30 RX ORDER — HYDROMORPHONE HYDROCHLORIDE 1 MG/ML
0.2 INJECTION, SOLUTION INTRAMUSCULAR; INTRAVENOUS; SUBCUTANEOUS EVERY 5 MIN PRN
Status: DISCONTINUED | OUTPATIENT
Start: 2023-10-30 | End: 2023-11-03 | Stop reason: HOSPADM

## 2023-10-30 RX ORDER — CEFAZOLIN SODIUM/WATER 2 G/20 ML
2 SYRINGE (ML) INTRAVENOUS
Status: COMPLETED | OUTPATIENT
Start: 2023-10-30 | End: 2023-10-30

## 2023-10-30 RX ORDER — SODIUM CHLORIDE, SODIUM LACTATE, POTASSIUM CHLORIDE, CALCIUM CHLORIDE 600; 310; 30; 20 MG/100ML; MG/100ML; MG/100ML; MG/100ML
INJECTION, SOLUTION INTRAVENOUS CONTINUOUS
Status: DISCONTINUED | OUTPATIENT
Start: 2023-10-30 | End: 2023-11-03 | Stop reason: HOSPADM

## 2023-10-30 RX ORDER — FENTANYL CITRATE 50 UG/ML
25 INJECTION, SOLUTION INTRAMUSCULAR; INTRAVENOUS
Status: DISCONTINUED | OUTPATIENT
Start: 2023-10-30 | End: 2023-11-03 | Stop reason: HOSPADM

## 2023-10-30 RX ORDER — OXYCODONE HYDROCHLORIDE 5 MG/1
5-10 TABLET ORAL EVERY 4 HOURS PRN
Qty: 12 TABLET | Refills: 0 | Status: SHIPPED | OUTPATIENT
Start: 2023-10-30 | End: 2023-11-17

## 2023-10-30 RX ORDER — ONDANSETRON 2 MG/ML
4 INJECTION INTRAMUSCULAR; INTRAVENOUS EVERY 30 MIN PRN
Status: DISCONTINUED | OUTPATIENT
Start: 2023-10-30 | End: 2023-11-03 | Stop reason: HOSPADM

## 2023-10-30 RX ORDER — OXYCODONE HYDROCHLORIDE 5 MG/1
5 TABLET ORAL
Status: DISCONTINUED | OUTPATIENT
Start: 2023-10-30 | End: 2023-11-03 | Stop reason: HOSPADM

## 2023-10-30 RX ORDER — KETOROLAC TROMETHAMINE 30 MG/ML
INJECTION, SOLUTION INTRAMUSCULAR; INTRAVENOUS PRN
Status: DISCONTINUED | OUTPATIENT
Start: 2023-10-30 | End: 2023-10-30

## 2023-10-30 RX ORDER — FENTANYL CITRATE 50 UG/ML
INJECTION, SOLUTION INTRAMUSCULAR; INTRAVENOUS PRN
Status: DISCONTINUED | OUTPATIENT
Start: 2023-10-30 | End: 2023-10-30

## 2023-10-30 RX ORDER — LIDOCAINE HYDROCHLORIDE 20 MG/ML
INJECTION, SOLUTION INFILTRATION; PERINEURAL PRN
Status: DISCONTINUED | OUTPATIENT
Start: 2023-10-30 | End: 2023-10-30

## 2023-10-30 RX ORDER — OXYCODONE HYDROCHLORIDE 10 MG/1
10 TABLET ORAL
Status: DISCONTINUED | OUTPATIENT
Start: 2023-10-30 | End: 2023-11-03 | Stop reason: HOSPADM

## 2023-10-30 RX ORDER — MAGNESIUM HYDROXIDE 1200 MG/15ML
LIQUID ORAL PRN
Status: DISCONTINUED | OUTPATIENT
Start: 2023-10-30 | End: 2023-10-30 | Stop reason: HOSPADM

## 2023-10-30 RX ORDER — FENTANYL CITRATE 50 UG/ML
25 INJECTION, SOLUTION INTRAMUSCULAR; INTRAVENOUS EVERY 5 MIN PRN
Status: DISCONTINUED | OUTPATIENT
Start: 2023-10-30 | End: 2023-11-03 | Stop reason: HOSPADM

## 2023-10-30 RX ORDER — FENTANYL CITRATE 50 UG/ML
50 INJECTION, SOLUTION INTRAMUSCULAR; INTRAVENOUS EVERY 5 MIN PRN
Status: DISCONTINUED | OUTPATIENT
Start: 2023-10-30 | End: 2023-11-03 | Stop reason: HOSPADM

## 2023-10-30 RX ORDER — DEXAMETHASONE SODIUM PHOSPHATE 4 MG/ML
INJECTION, SOLUTION INTRA-ARTICULAR; INTRALESIONAL; INTRAMUSCULAR; INTRAVENOUS; SOFT TISSUE PRN
Status: DISCONTINUED | OUTPATIENT
Start: 2023-10-30 | End: 2023-10-30

## 2023-10-30 RX ORDER — HYDROMORPHONE HYDROCHLORIDE 1 MG/ML
0.4 INJECTION, SOLUTION INTRAMUSCULAR; INTRAVENOUS; SUBCUTANEOUS EVERY 5 MIN PRN
Status: DISCONTINUED | OUTPATIENT
Start: 2023-10-30 | End: 2023-11-03 | Stop reason: HOSPADM

## 2023-10-30 RX ADMIN — PHENYLEPHRINE HYDROCHLORIDE 100 MCG: 10 INJECTION INTRAVENOUS at 13:09

## 2023-10-30 RX ADMIN — Medication 2 G: at 13:31

## 2023-10-30 RX ADMIN — ACETAMINOPHEN 975 MG: 325 TABLET, FILM COATED ORAL at 14:27

## 2023-10-30 RX ADMIN — Medication 50 MG: at 12:51

## 2023-10-30 RX ADMIN — PROPOFOL 30 MCG/KG/MIN: 10 INJECTION, EMULSION INTRAVENOUS at 13:00

## 2023-10-30 RX ADMIN — DEXAMETHASONE SODIUM PHOSPHATE 8 MG: 4 INJECTION, SOLUTION INTRA-ARTICULAR; INTRALESIONAL; INTRAMUSCULAR; INTRAVENOUS; SOFT TISSUE at 12:51

## 2023-10-30 RX ADMIN — FENTANYL CITRATE 25 MCG: 50 INJECTION, SOLUTION INTRAMUSCULAR; INTRAVENOUS at 14:17

## 2023-10-30 RX ADMIN — FENTANYL CITRATE 25 MCG: 50 INJECTION INTRAMUSCULAR; INTRAVENOUS at 13:41

## 2023-10-30 RX ADMIN — PROPOFOL 150 MG: 10 INJECTION, EMULSION INTRAVENOUS at 12:51

## 2023-10-30 RX ADMIN — FENTANYL CITRATE 25 MCG: 50 INJECTION, SOLUTION INTRAMUSCULAR; INTRAVENOUS at 14:11

## 2023-10-30 RX ADMIN — SODIUM CHLORIDE, POTASSIUM CHLORIDE, SODIUM LACTATE AND CALCIUM CHLORIDE: 600; 310; 30; 20 INJECTION, SOLUTION INTRAVENOUS at 12:43

## 2023-10-30 RX ADMIN — LIDOCAINE HYDROCHLORIDE 100 MG: 20 INJECTION, SOLUTION INFILTRATION; PERINEURAL at 12:51

## 2023-10-30 RX ADMIN — SUGAMMADEX 200 MG: 100 INJECTION, SOLUTION INTRAVENOUS at 13:49

## 2023-10-30 RX ADMIN — MIDAZOLAM 2 MG: 1 INJECTION INTRAMUSCULAR; INTRAVENOUS at 12:43

## 2023-10-30 RX ADMIN — FENTANYL CITRATE 75 MCG: 50 INJECTION INTRAMUSCULAR; INTRAVENOUS at 12:51

## 2023-10-30 RX ADMIN — KETOROLAC TROMETHAMINE 30 MG: 30 INJECTION, SOLUTION INTRAMUSCULAR at 13:42

## 2023-10-30 RX ADMIN — PHENYLEPHRINE HYDROCHLORIDE 100 MCG: 10 INJECTION INTRAVENOUS at 13:19

## 2023-10-30 ASSESSMENT — ACTIVITIES OF DAILY LIVING (ADL)
ADLS_ACUITY_SCORE: 35

## 2023-10-30 NOTE — ANESTHESIA POSTPROCEDURE EVALUATION
Patient: Tacho Lazar    Procedure: Procedure(s):  biopsy left distal femur.       Anesthesia Type:  General    Note:  Disposition: Outpatient   Postop Pain Control: Uneventful            Sign Out: Well controlled pain   PONV: No   Neuro/Psych: Uneventful            Sign Out: Acceptable/Baseline neuro status   Airway/Respiratory: Uneventful            Sign Out: Acceptable/Baseline resp. status   CV/Hemodynamics: Uneventful            Sign Out: Acceptable CV status; No obvious hypovolemia; No obvious fluid overload   Other NRE: NONE   DID A NON-ROUTINE EVENT OCCUR?            Last vitals:  Vitals Value Taken Time   /83 10/30/23 1445   Temp 36.8  C (98.2  F) 10/30/23 1430   Pulse 92 10/30/23 1449   Resp 9 10/30/23 1449   SpO2 97 % 10/30/23 1451   Vitals shown include unfiled device data.    Electronically Signed By: Juan Subramanian MD  October 30, 2023  3:46 PM

## 2023-10-30 NOTE — OP NOTE
Preop diagnosis: Tumor left distal femur    Postoperative diagnosis: Same    Procedure performed: Biopsy tumor left distal femur    Surgeon: Jeremie Dougherty and Jonathan lee    Estimated blood loss: 50 cc    Pathology submitted: 1.  Frozen section tumor left distal femur  2.  Cultures for aerobic anaerobic fungus TB.  3.  Final pathology tumor left distal femur    Patient was interviewed in the preoperative area risk and benefits have been reviewed.  Consent was signed.  The surgical site was marked with my initials and line of intended incision.    Preoperative briefing was performed.  The patient was taken the operating room in a supine position the left leg was prepped and draped sterilely.  Surgical timeout was performed.    2 inch incision was made over the lateral aspect of left distal thigh.  Sharp dissection was taken down through skin and subcutaneous tissue.  The superficial fascia was incised just anterior to the IT band.  The femur was exposed and opened with the drill.  The biopsy site was curetted and then packed with Gelfoam.  The tests as described above were ordered.    The frozen section was interpreted to show metastatic carcinoma.    Postoperative plan: 1.  Discharge home today.  2.  Oxycodone for pain.  I spoke with her  about administering this with the Advil.  3.  Body PET CT scan has been ordered.  4.  Medical oncology and radiation therapy consults ordered.

## 2023-10-30 NOTE — ANESTHESIA PREPROCEDURE EVALUATION
"Anesthesia Pre-Procedure Evaluation    Patient: Tacho Lazar   MRN: [id number] : 1975        Procedure : Procedure(s):  biopsy left distal femur.  possible plating          Past Medical History:   Diagnosis Date    Bone cyst of femur     Essential hypertension       Past Surgical History:   Procedure Laterality Date    ENT SURGERY      [place] teeth extracted.      No Known Allergies   Social History     Tobacco Use    Smoking status: Never    Smokeless tobacco: Never   Substance Use Topics    Alcohol use: Yes     Comment: Social      Wt Readings from Last 1 Encounters:   10/26/23 82.3 kg (181 lb 6.4 oz)        Anesthesia Evaluation            ROS/MED HX  ENT/Pulmonary:       Neurologic:       Cardiovascular:     (+)  hypertension- -   -  - -                                      METS/Exercise Tolerance:     Hematologic:       Musculoskeletal:       GI/Hepatic:     (+) GERD, Asymptomatic on medication,                  Renal/Genitourinary:       Endo:     (+)               Obesity,       Psychiatric/Substance Use:       Infectious Disease:       Malignancy:       Other:            Physical Exam    Airway        Mallampati: II       Respiratory Devices and Support         Dental       (+) Minor Abnormalities - some fillings, tiny chips      Cardiovascular          Rhythm and rate: regular     Pulmonary           breath sounds clear to auscultation           OUTSIDE LABS:  CBC:   Lab Results   Component Value Date    WBC 7.9 10/26/2023    HGB 10.2 (L) 10/26/2023    HCT 30.2 (L) 10/26/2023     (H) 10/26/2023     BMP:   Lab Results   Component Value Date     (L) 10/26/2023    POTASSIUM 3.3 (L) 10/26/2023    CHLORIDE 90 (L) 10/26/2023    CO2 25 10/26/2023    BUN 14.1 10/26/2023    CR 0.73 10/26/2023    GLC 95 10/26/2023     COAGS: No results found for: \"PTT\", \"INR\", \"FIBR\"  POC: No results found for: \"BGM\", \"HCG\", \"HCGS\"  HEPATIC:   Lab Results   Component Value Date    ALBUMIN 4.1 10/26/2023    " PROTTOTAL 8.4 (H) 10/26/2023    ALT 16 10/26/2023    AST 25 10/26/2023    ALKPHOS 110 (H) 10/26/2023    BILITOTAL 0.4 10/26/2023     OTHER:   Lab Results   Component Value Date    JONES 10.1 (H) 10/26/2023     (H) 10/26/2023       Anesthesia Plan    ASA Status:  2    NPO Status:  NPO Appropriate    Anesthesia Type: General.     - Airway: LMA   Induction: Intravenous.   Maintenance: Balanced.        Consents    Anesthesia Plan(s) and associated risks, benefits, and realistic alternatives discussed. Questions answered and patient/representative(s) expressed understanding.     - Discussed:     - Discussed with:  Patient            Postoperative Care    Pain management: Oral pain medications, IV analgesics, Multi-modal analgesia.   PONV prophylaxis: Ondansetron (or other 5HT-3), Dexamethasone or Solumedrol, Background Propofol Infusion     Comments:                Juan Subramanian MD

## 2023-10-30 NOTE — ANESTHESIA PROCEDURE NOTES
Airway       Patient location during procedure: OR  Staff -        Performed By: CRNAIndications and Patient Condition       Indications for airway management: jun-procedural       Induction type:intravenous       Mask difficulty assessment: 1 - vent by mask    Final Airway Details       Final airway type: endotracheal airway       Successful airway: Oral  Endotracheal Airway Details        Cuffed: yes       Successful intubation technique: direct laryngoscopy       DL Blade Type: MAC 3       Grade View of Cords: 1       Adjucts: stylet       Position: Right       Bite block used: None    Post intubation assessment        Placement verified by: capnometry, equal breath sounds and chest rise        Number of attempts at approach: 1       Number of other approaches attempted: 0       Secured with: silk tape       Ease of procedure: easy       Dentition: Intact

## 2023-10-30 NOTE — DISCHARGE INSTRUCTIONS

## 2023-10-30 NOTE — BRIEF OP NOTE
[facility]    Brief Operative Note    Pre-operative diagnosis: Bone cyst [M85.60]  Post-operative diagnosis Same as pre-operative diagnosis    Procedure: biopsy left distal femur., Left - Leg    Surgeon: Surgeon(s) and Role:     * Jeremie Dougherty MD - Primary     * Chayo Subramanian PA-C - Assisting     * Francisco Galloway MD - Resident - Assisting  Anesthesia: General   Estimated Blood Loss: Less than 20 ml    Drains: None  Specimens:   ID Type Source Tests Collected by Time Destination   1 : Left femur tumor Bone Biopsy Femur, Left SURGICAL PATHOLOGY EXAM Jeremie Dougherty MD 10/30/2023  1:26 PM    2 : FINAL, tumor left femur, please save some for immunophenotyping if needed Bone Biopsy Femur, Left SURGICAL PATHOLOGY EXAM Jeremie Dougherty MD 10/30/2023  1:39 PM    A : Left femur biopsy Bone Biopsy Femur, Left ANAEROBIC BACTERIAL CULTURE ROUTINE, FUNGAL OR YEAST CULTURE ROUTINE, AEROBIC BACTERIAL CULTURE ROUTINE, MTB COMPLEX AND RESISTANCE Jeremie Dougherty MD 10/30/2023  1:32 PM      Findings:   See full op note .  Complications: None.  Implants: * No implants in log *    POSTOP PLAN:  Discontinue to home per pacu protocol  WBAT, ROMAT LLE, no running, jumping or other strenuous activity  Follow up in clinic as previously scheduled    Francisco Galloway MD  Orthopedic Surgery, PGY-4

## 2023-10-30 NOTE — ANESTHESIA CARE TRANSFER NOTE
Patient: Tacho Lazar    Procedure: Procedure(s):  biopsy left distal femur.       Diagnosis: Bone cyst [M85.60]  Diagnosis Additional Information: No value filed.    Anesthesia Type:   General     Note:    Oropharynx: spontaneously breathing  Level of Consciousness: awake  Oxygen Supplementation: face mask  Level of Supplemental Oxygen (L/min / FiO2): 8  Independent Airway: airway patency satisfactory and stable  Dentition: dentition unchanged  Vital Signs Stable: post-procedure vital signs reviewed and stable  Report to RN Given: handoff report given  Patient transferred to: PACU    Handoff Report: Identifed the Patient, Identified the Reponsible Provider, Reviewed the pertinent medical history, Discussed the surgical course, Reviewed Intra-OP anesthesia mangement and issues during anesthesia, Set expectations for post-procedure period and Allowed opportunity for questions and acknowledgement of understanding    Vitals:  Vitals Value Taken Time   /66 10/30/23 1357   Temp     Pulse 92 10/30/23 1359   Resp 27 10/30/23 1359   SpO2 100 % 10/30/23 1359   Vitals shown include unfiled device data.    Electronically Signed By: FELIX Andrews CRNA  October 30, 2023  2:01 PM

## 2023-10-31 ENCOUNTER — MYC REFILL (OUTPATIENT)
Dept: ORTHOPEDICS | Facility: CLINIC | Age: 48
End: 2023-10-31
Payer: COMMERCIAL

## 2023-10-31 DIAGNOSIS — F40.240 CLAUSTROPHOBIA: Primary | ICD-10-CM

## 2023-10-31 ASSESSMENT — ACTIVITIES OF DAILY LIVING (ADL)
ADLS_ACUITY_SCORE: 35

## 2023-10-31 NOTE — TELEPHONE ENCOUNTER
Call placed to patient. She has claustrophobia and has required Valium for previous MRI's. Confirmed pharmacy, prescription pended and routed to PA. Informed patient prescription had to be signed before it will be available for pickup at pharmacy.    Instructed patient to bring medication to appointment and not take until instructed by staff. She will have her  drive her to and from the appointment. She is currently taking 5 mg of Oxycodone every 6 hours. Advised patient against taking Valium and Oxycodone at the same time. Patient verbalized understanding.     Patient was hoping to talk to Dr. Dougherty after her PET/CT and pathology results are final. Virtual visit scheduled for November 11th at 2:15.    Tara Holter, RNCC

## 2023-11-01 ENCOUNTER — HOSPITAL ENCOUNTER (OUTPATIENT)
Dept: PET IMAGING | Facility: CLINIC | Age: 48
Setting detail: NUCLEAR MEDICINE
Discharge: HOME | End: 2023-11-01
Attending: PHYSICIAN ASSISTANT | Admitting: PHYSICIAN ASSISTANT
Payer: COMMERCIAL

## 2023-11-01 DIAGNOSIS — C80.1 METASTATIC CARCINOMA INVOLVING BONE WITH UNKNOWN PRIMARY SITE (H): ICD-10-CM

## 2023-11-01 DIAGNOSIS — C79.51 METASTATIC CARCINOMA INVOLVING BONE WITH UNKNOWN PRIMARY SITE (H): ICD-10-CM

## 2023-11-01 PROCEDURE — 343N000001 HC RX 343: Performed by: PHYSICIAN ASSISTANT

## 2023-11-01 PROCEDURE — 250N000011 HC RX IP 250 OP 636: Performed by: PHYSICIAN ASSISTANT

## 2023-11-01 PROCEDURE — 74177 CT ABD & PELVIS W/CONTRAST: CPT | Mod: 26 | Performed by: STUDENT IN AN ORGANIZED HEALTH CARE EDUCATION/TRAINING PROGRAM

## 2023-11-01 PROCEDURE — 78815 PET IMAGE W/CT SKULL-THIGH: CPT | Mod: PI

## 2023-11-01 PROCEDURE — A9552 F18 FDG: HCPCS | Performed by: PHYSICIAN ASSISTANT

## 2023-11-01 PROCEDURE — 74177 CT ABD & PELVIS W/CONTRAST: CPT

## 2023-11-01 PROCEDURE — 999N000127 HC STATISTIC PERIPHERAL IV START W US GUIDANCE

## 2023-11-01 PROCEDURE — 71260 CT THORAX DX C+: CPT | Mod: 26 | Performed by: STUDENT IN AN ORGANIZED HEALTH CARE EDUCATION/TRAINING PROGRAM

## 2023-11-01 PROCEDURE — 78815 PET IMAGE W/CT SKULL-THIGH: CPT | Mod: 26 | Performed by: STUDENT IN AN ORGANIZED HEALTH CARE EDUCATION/TRAINING PROGRAM

## 2023-11-01 RX ORDER — IOPAMIDOL 755 MG/ML
10-135 INJECTION, SOLUTION INTRAVASCULAR ONCE
Status: COMPLETED | OUTPATIENT
Start: 2023-11-01 | End: 2023-11-01

## 2023-11-01 RX ADMIN — IOPAMIDOL 108 ML: 755 INJECTION, SOLUTION INTRAVENOUS at 11:44

## 2023-11-01 RX ADMIN — FLUDEOXYGLUCOSE F-18 10.62 MILLICURIE: 500 INJECTION, SOLUTION INTRAVENOUS at 10:50

## 2023-11-01 ASSESSMENT — ACTIVITIES OF DAILY LIVING (ADL)
ADLS_ACUITY_SCORE: 35

## 2023-11-01 NOTE — PROGRESS NOTES
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      * 10/30/2023 left femur biopsy (Pre-quinones report):    Intraoperative Consultation P    A(1). Femur, Left, Left femur tumor:  AFS1:  Metastatic carcinoma       * 11/1/2023 PET ([facility])  IMPRESSION: This patient with recently diagnosed metastatic carcinoma  of unknown primary on femoral bone biopsy who presents for a staging  exam:     1. Heterogeneous solid cystic hypermetabolic pelvic mass abutting the  uterus and cecum. This is concerning for a primary tumor, possibly an  adnexal mass. There is a smaller mass at the cecum which is likely a  similar process.     2. Multiple lytic osseous and pulmonary metastatic lesions are  throughout the axial and appendicular skeleton and in both lung fields  suspicious for diffuse metastasis. No definite lymphadenopathy seen on  current exam.     3. A hypermetabolic right renal cortical soft tissue nodule is  indeterminate, this may represent metastatic disease versus second  primary such as renal cell carcinoma. Dedicated renal protocol MRI  pelvis may be helpful for further characterization.       Clinical Assessment / Barriers to Care (Per Nurse):    Left femur biopsy with prelim path showing \"metastatic carcinoma.\" Per Onc teams, wait for final path for site of origin, then send to appropriate specialty team.       Addendum 11/1/2023: I spoke to patient, we reviewed the " "oncology referral from Dr Dougherty. Her femur bone biopsy is not quite final yet, only prelim results \"metastatic carcinoma.\" PET scan completed today (11/1/2023). Will wait for final path before sending her to Oncology specialist. Pt agrees & verbalizes understanding of this plan.    Per Pathologist, Dr Randolph, he may have final results tomorrow (11/2/2023).       Records Location: [facility]   Faxed - Media tab/Scanned     Records Needed:     Outside images, path completed at [facility]     Additional testing needed prior to consult:     MAITE Fuentes, RN, BSN, OCN  Oncology New Patient Nurse Navigator   [facility]  [phone]         "

## 2023-11-02 DIAGNOSIS — C80.1 METASTATIC CARCINOMA INVOLVING BONE WITH UNKNOWN PRIMARY SITE (H): Primary | ICD-10-CM

## 2023-11-02 DIAGNOSIS — C79.51 METASTATIC CARCINOMA INVOLVING BONE WITH UNKNOWN PRIMARY SITE (H): Primary | ICD-10-CM

## 2023-11-02 LAB
ANNOTATION COMMENT IMP: NOT DETECTED
DEPRECATED M TB RPOB XXX QL NAA+PROBE: NORMAL
M TB DNA SPEC QL NAA+PROBE: NOT DETECTED

## 2023-11-02 RX ORDER — OXYCODONE HYDROCHLORIDE 5 MG/1
5 TABLET ORAL EVERY 6 HOURS PRN
Qty: 12 TABLET | Refills: 0 | Status: SHIPPED | OUTPATIENT
Start: 2023-11-02 | End: 2023-11-05

## 2023-11-02 ASSESSMENT — ACTIVITIES OF DAILY LIVING (ADL)
ADLS_ACUITY_SCORE: 35

## 2023-11-03 ASSESSMENT — ACTIVITIES OF DAILY LIVING (ADL)
ADLS_ACUITY_SCORE: 35

## 2023-11-03 NOTE — TELEPHONE ENCOUNTER
November 3, 2023 11:03 AM CAROLYNE   Internal Referral, Per call to PT, confirmed no previous radiation or outside records needed

## 2023-11-03 NOTE — TELEPHONE ENCOUNTER
November 3, 2023 11:05 AM CAROLYNE   Internal Referral, Per call to PT, confirmed no previous radiation or outside records needed

## 2023-11-05 LAB — BACTERIA BONE ANAEROBE+AEROBE CULT: NO GROWTH

## 2023-11-06 ENCOUNTER — PATIENT OUTREACH (OUTPATIENT)
Dept: ONCOLOGY | Facility: CLINIC | Age: 48
End: 2023-11-06
Payer: COMMERCIAL

## 2023-11-06 ENCOUNTER — MYC MEDICAL ADVICE (OUTPATIENT)
Dept: ORTHOPEDICS | Facility: CLINIC | Age: 48
End: 2023-11-06
Payer: COMMERCIAL

## 2023-11-06 LAB — BACTERIA BONE ANAEROBE+AEROBE CULT: NORMAL

## 2023-11-06 NOTE — TELEPHONE ENCOUNTER
- A call was placed to the patient.     - I let patient know the pathology diagnosis was as follows:   - Metastatic high grade malignancy most consistent with melanoma     They changed her appointments so she would be seeing oncology doctors the specialize in melanoma.     I let her know that these doctors will the have more accurate information and to go with questions. She agreed to this.     - Patient verbalized understanding of plan and all questions were answered. Call back number to clinic was given and patient was told to call if they had an further questions.

## 2023-11-06 NOTE — PROGRESS NOTES
New Patient Oncology Nurse Navigator Note     Referring provider: Chayo Mendoza PA-C       Referring Clinic/Organization: [facility]     Referred to (specialty:) Medical Oncology     Requested provider (if applicable): NA     Date Referral Received: October 30, 2023     Evaluation for:  C79.51, C80.1 (ICD-10-CM) - Metastatic carcinoma involving bone with unknown primary site (H)      Clinical History (per Nurse review of records provided):      See Nurse Navigator Note for 10/30/23    PET scan on 11/01/23 showed:  IMPRESSION: This patient with recently diagnosed metastatic carcinoma  of unknown primary on femoral bone biopsy who presents for a staging  exam:     1. Heterogeneous solid cystic hypermetabolic pelvic mass abutting the  uterus and cecum. This is concerning for a primary tumor, possibly an  adnexal mass. There is a smaller mass at the cecum which is likely a  similar process.     2. Multiple lytic osseous and pulmonary metastatic lesions are  throughout the axial and appendicular skeleton and in both lung fields  suspicious for diffuse metastasis. No definite lymphadenopathy seen on  current exam.     3. A hypermetabolic right renal cortical soft tissue nodule is  indeterminate, this may represent metastatic disease versus second  primary such as renal cell carcinoma. Dedicated renal protocol MRI  pelvis may be helpful for further characterization.    Biopsy  from 10/30/23 showed:    Final Diagnosis   A, B.  Bone, left femur tumor, biopsy:  - Metastatic high grade malignancy most consistent with melanoma  - See comment         Comments:   This is an appended report. These results have been appended to a previously preliminary verified report.      Electronically signed by Richard Cast MD on 11/3/2023 at  3:17 PM   Comment  [facility]   Imaging studies demonstrate a large pelvic mass abutting the uterus and cecum with multiple osseous and  "pulmonary lesions.  In conjunction with the lack of prior history of melanoma (per EMR), this clinical presentation is unusual for a melanoma.  However, the immunohistochemical profile of the tumor (see microscopic description) is strongly indicative of a melanoma. FISH test for EWSR1 gene rearrangement will be performed to evaluate for the possibility of clear cell sarcoma (formerly known as melanoma of soft parts); the results will be reported in an addendum.     Comment: This is an appended report. These results have been appended to a previously preliminary verified report.   Clinical Information  [facility]   History of a heterogeneous solid cystic hypermetabolic pelvic mass abutting the uterus and cecum, multiple lytic osseous and pulmonary metastatic lesions, and a right renal cortical soft tissue nodule.    Comment: This is an appended report. These results have been appended to a previously preliminary verified report.   Intraoperative Consultation  [facility]   A(1). Femur, Left, Left femur tumor:  AFS1:  Metastatic carcinoma   Gross Description  [facility]   A(1). Femur, Left, Left femur tumor:  The specimen is received fresh for frozen section, with proper patient identification, labeled \"left femur tumor\".  It consists of a 1.7 x 0.7 x 0.6 cm aggregate of bone and tan-red soft tissue fragments.  The soft tissue is submitted entirely for frozen section.  The remainder of the frozen section block is submitted as A1 FS.  The bone is wrapped and submitted in cassette A2 (decal).  B(2). Femur, Left, FINAL, tumor left femur, please save some for immunophenotyping if needed:  The specimen is received fresh with proper patient identification labeled \"tumor left femur\".  The specimen consists of a 4.0 x 3.5 x 1.5 cm aggregate of tan-white soft tissue fragments and bone.  The larger pieces are sectioned and the specimen is wrapped and entirely submitted.     B1: Bone (decal)  B2-B3: Soft tissue   Comment: This is an " appended report. These results have been appended to a previously preliminary verified report.        Records Location: See Bookmarked material     Records Needed: NA     Additional testing needed prior to consult: NA    Payor: [employer] / Plan: [employer] / Product Type: HMO /     November 6, 2023  Called patient this afternoon to update her on the plan of care.   Patient had not yet heard final pathology results and was encouraged to call Dr. Dougherty's team for further detail. Rescheduled Patient [place] [facility] Provided patient with contact information and encouraged her to call with any questions or concerns.     Maria Victoria DIGGSN, RN   Oncology Nurse Navigator   [facility]   [phone] / [phone]

## 2023-11-07 ENCOUNTER — OFFICE VISIT (OUTPATIENT)
Dept: RADIATION ONCOLOGY | Facility: CLINIC | Age: 48
End: 2023-11-07
Attending: PHYSICIAN ASSISTANT
Payer: COMMERCIAL

## 2023-11-07 ENCOUNTER — PRE VISIT (OUTPATIENT)
Dept: RADIATION ONCOLOGY | Facility: CLINIC | Age: 48
End: 2023-11-07

## 2023-11-07 DIAGNOSIS — C79.51 METASTATIC CARCINOMA INVOLVING BONE WITH UNKNOWN PRIMARY SITE (H): ICD-10-CM

## 2023-11-07 DIAGNOSIS — G89.3 CANCER RELATED PAIN: Primary | ICD-10-CM

## 2023-11-07 DIAGNOSIS — C80.1 METASTATIC CARCINOMA INVOLVING BONE WITH UNKNOWN PRIMARY SITE (H): ICD-10-CM

## 2023-11-07 LAB
INTERPRETATION: NORMAL
PATH REPORT.ADDENDUM SPEC: ABNORMAL
PATH REPORT.COMMENTS IMP SPEC: ABNORMAL
PATH REPORT.COMMENTS IMP SPEC: YES
PATH REPORT.FINAL DX SPEC: ABNORMAL
PATH REPORT.GROSS SPEC: ABNORMAL
PATH REPORT.INTRAOP OBS SPEC DOC: ABNORMAL
PATH REPORT.MICROSCOPIC SPEC OTHER STN: ABNORMAL
PATH REPORT.RELEVANT HX SPEC: ABNORMAL
PHOTO IMAGE: ABNORMAL

## 2023-11-07 PROCEDURE — 99417 PROLNG OP E/M EACH 15 MIN: CPT | Performed by: RADIOLOGY

## 2023-11-07 PROCEDURE — 99211 OFF/OP EST MAY X REQ PHY/QHP: CPT | Performed by: RADIOLOGY

## 2023-11-07 PROCEDURE — 99205 OFFICE O/P NEW HI 60 MIN: CPT | Performed by: RADIOLOGY

## 2023-11-07 RX ORDER — OXYCODONE HYDROCHLORIDE 5 MG/1
5-10 TABLET ORAL EVERY 6 HOURS PRN
Qty: 40 TABLET | Refills: 0 | Status: SHIPPED | OUTPATIENT
Start: 2023-11-07 | End: 2023-11-27

## 2023-11-07 ASSESSMENT — ENCOUNTER SYMPTOMS
CARDIOVASCULAR NEGATIVE: 1
EYES NEGATIVE: 1
RESPIRATORY NEGATIVE: 1
CONSTIPATION: 1
NERVOUS/ANXIOUS: 1
NAUSEA: 1
VOMITING: 1
NEUROLOGICAL NEGATIVE: 1

## 2023-11-07 NOTE — LETTER
11/7/2023         RE: Tacho Lazar  [address]  [facility] [address]        Dear Colleague,    Thank you for referring your patient, Tacho Lazar, to the [facility] RADIATION ONCOLOGY. Please see a copy of my visit note below.    RADIATION ONCOLOGY CONSULTATION  DATE OF VISIT: Nov 7, 2023    Tacho Lazar  MRN: [id number]    PROBLEM:   Ms. Tacho Lazar was seen for initial consultation in the Department of Radiation Oncology on 11/7/2023 at the request of Dr. Dougherty for new diagnosis of metastatic melanoma.  All available pertinent medical records, radiographic and laboratory studies were reviewed.    HISTORY OF PRESENT ILLNESS:   Tacho Lazar is a 47-year-old woman who presented to her primary care physician in early October 2023 with left knee pain of several months duration which did not improve with physical therapy.  The pain was significant enough to keep her from sleeping.  Additionally, she had symptoms of pronounced fatigue which she thought was related to her son's recent diagnosis of mononucleosis, as well as nausea and vomiting which she attributes to the knee pain.  She was initially evaluated and felt to have an ACL injury.  An MRI scan of the left knee was obtained on 10/20/2023 which demonstrated osseous lesions within the distal femur which were new compared to prior examination on April 3, 2015.  Findings were concerning for malignancy.  She saw Dr. Dougherty and underwent biopsy of the left distal femur on 10/30/2023.    Pathology demonstrated metastatic high-grade malignancy most consistent with melanoma.  IHC stains show positivity for SOX10, S100, Melan-A, HMB45, PRAME and BRAF V600E.  Staining was negative for desmin, DOG1, INSM1, SSX 18 and SF-1 while SATB2 and SSXC show patchy weak reactivity.    She underwent PET CT scan on 11/1/2023 which demonstrated a heterogeneous solid cystic hypermetabolic pelvic mass abutting the uterus and cecum concerning for primary tumor  possibly representing an adnexal mass with a smaller mass at the cecum which is likely of similar process.  There were multiple lytic osseous metastatic lesions throughout the axial and appendicular skeleton and in both lung fields suspicious for diffuse metastasis.  There was a hypermetabolic right renal cortical soft tissue nodule which was indeterminant either representing metastatic disease versus second primary such as renal cell carcinoma.  Within the bones, there were multiple lytic lesions within the vertebral bodies, body of the sternum, left sacral ala, bilateral humeri, bilateral femurs.    She was referred to Radiation Oncology for consideration of palliative radiation to the left femur.    Aside from fatigue and left thigh pain, she also notes frequent urination and has been getting up a few times each night to empty her bladder.  She has not been experiencing any vaginal bleeding in between menstrual cycles.  She has not experienced any unexplained weight loss.  The nausea and vomiting improved when she started taking oxycodone for pain.  She is not having any headaches, confusion, visual changes, imbalance.    PAST MEDICAL HISTORY:   Past Medical History:   Diagnosis Date    Bone cyst of femur     Essential hypertension      PAST SURGICAL HISTORY:   Past Surgical History:   Procedure Laterality Date    BIOPSY BONE LOWER EXTREMITY Left 10/30/2023    Procedure: Biopsy left distal femur;  Surgeon: Jeremie Dougherty MD;  Location: [facility] OR    ENT SURGERY      [place] teeth extracted.     CHEMOTHERAPY HISTORY: None    PAST RADIATION THERAPY HISTORY: None    IMPLANTABLE CARDIAC DEVICE: None    MEDICATIONS:   Current Outpatient Medications   Medication Sig Dispense Refill    acetaminophen (TYLENOL) 500 MG tablet Take 500-1,000 mg by mouth every 6 hours as needed for mild pain      diazepam (VALIUM) 5 MG tablet Do no take until instructed by staff at appointment. 1 tablet 0    ibuprofen (ADVIL/MOTRIN) 200 MG  tablet Take 200 mg by mouth every 4 hours as needed for pain      lisinopril-hydrochlorothiazide (ZESTORETIC) 10-12.5 MG tablet Take 1 tablet by mouth every morning      omeprazole 20 MG tablet Take 20 mg by mouth      ondansetron (ZOFRAN ODT) 4 MG ODT tab Place 4 mg under the tongue every 8 hours as needed      oxyCODONE (ROXICODONE) 5 MG tablet Take 1-2 tablets (5-10 mg) by mouth every 4 hours as needed for moderate to severe pain 12 tablet 0    oxyCODONE (ROXICODONE) 5 MG tablet Take 5 mg by mouth every 6 hours as needed      traZODone (DESYREL) 50 MG tablet Take 50 mg by mouth at bedtime        ALLERGIES:   Allergies as of 2023    (No Known Allergies)     SOCIAL HISTORY:      Social History     Socioeconomic History    Marital status:      Spouse name: Not on file    Number of children: Not on file    Years of education: Not on file    Highest education level: Not on file   Occupational History    Not on file   Tobacco Use    Smoking status: Never    Smokeless tobacco: Never   Substance and Sexual Activity    Alcohol use: Yes     Comment: Social    Drug use: Not Currently    Sexual activity: Not on file   Other Topics Concern    Not on file   Social History Narrative    Not on file     Social Determinants of Health     Financial Resource Strain: Not on file   Food Insecurity: Not on file   Transportation Needs: Not on file   Physical Activity: Not on file   Stress: Not on file   Social Connections: Not on file   Interpersonal Safety: Not on file   Housing Stability: Not on file   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    FAMILY HISTORY: Noncontributory    REVIEW OF SYMPTOMS:  A full 12-point review of systems was performed. All pertinent positives and negatives are noted in HPI.    FUNCTIONAL STATUS: ECO    PHYSICAL EXAMINATION:    [facility] 10/30/2023 (Approximate)   Exam:  General: Alert, oriented, in no acute distress  The remainder of  physical examination was not performed today.    IMAGING/PATH: Please refer to history of present illness.    LABORATORY:  Lab Results   Component Value Date    WBC 7.9 10/26/2023    HGB 10.2 (L) 10/26/2023    HCT 30.2 (L) 10/26/2023    MCV 90 10/26/2023     (H) 10/26/2023     Lab Results   Component Value Date     (L) 10/26/2023    POTASSIUM 3.3 (L) 10/26/2023    CHLORIDE 90 (L) 10/26/2023    CO2 25 10/26/2023    GLC 95 10/26/2023     Lab Results   Component Value Date    AST 25 10/26/2023    ALT 16 10/26/2023    ALKPHOS 110 (H) 10/26/2023    BILITOTAL 0.4 10/26/2023      ASSESSMENT AND PLAN:   Tacho Lazar is a 47-year-old woman with new diagnosis of possible metastatic melanoma.  Her PET scan shows widely metastatic disease including a large pelvic mass.  I spent some time reviewing her case so far and going over her PET CT scan with the patient and her .  They asked several questions which were answered such that they verbalized understanding of the issues.  They understand the need for further evaluation prior to treatment recommendations.    At this time, her pain is well controlled with oxycodone and palliative radiation can be considered in the near future after further work-up.      Her case was discussed with Gynecologic Oncology for review of the pelvic mass.  The pathology does not have specific tumor markers to rule out a gynecologic origin of this tumor and these will be requested.  We will also plan to discuss her case in Gynecologic Oncology tumor conference tomorrow.  Additionally, she is currently scheduled to see Medical Oncology Dr. Reid on Friday.    Her PET scan shows several lytic lesions within the vertebral bodies with some anterior encroachment on the spinal canal at level T10 and I would recommend MRI of the spine as part of her further evaluation.  Additionally, given her history of nausea and vomiting despite lack of neurologic symptoms, a brain MRI for further  staging would help identify any potential brain metastasis.    I refilled her prescription for oxycodone.  We will contact her after discussion in tumor conference tomorrow morning to discuss next steps.  I have urged her to keep her an appointment with Dr. Reid as well.    We appreciate the opportunity to participate in Ms. Lazar' care.  Please call with any questions.    95 minutes spent by me on the date of the encounter doing chart review, history and exam, documentation and further activities per the note.    Catalina Britt MD  Department of Radiation Oncology  [facility]     CC  Patient Care Team:  Aaliyah Lyon MD as PCP - General (Family Medicine)  Ajit Viveros MD as MD (Hematology & Oncology)  Zeeshan Reid MD as MD (Hematology & Oncology)  JEANNIE MCDANIEL Deanna MD    This note was dictated with voice recognition software and then edited. Please excuse any unintentional errors.       HPI  INITIAL PATIENT ASSESSMENT    Diagnosis: Cancer    Prior radiation therapy: None    Prior chemotherapy: None    Prior hormonal therapy:No    Pain Eval:  Current history of pain associated with this visit:   Intensity: 6/10  Current: dull  Location: Femur  Treatment: Oxycodone 5 mg    Psychosocial  Living arrangements: Lives with family  Fall Risk: independent   referral needs: Not needed    Advanced Directive: No  Implantable Cardiac Device? No      Review of Systems   Constitutional:  Positive for malaise/fatigue.   HENT: Negative.     Eyes: Negative.    Respiratory: Negative.     Cardiovascular: Negative.    Gastrointestinal:  Positive for constipation, nausea and vomiting.   Genitourinary: Negative.    Musculoskeletal:         Leg pain   Skin: Negative.    Neurological: Negative.    Endo/Heme/Allergies: Negative.    Psychiatric/Behavioral:  The patient is nervous/anxious.        Again, thank you for allowing me to participate in the care of your  patient.        Sincerely,        Catalina Britt MD

## 2023-11-07 NOTE — PROGRESS NOTES
RADIATION ONCOLOGY CONSULTATION  DATE OF VISIT: Nov 7, 2023    Tacho Lazar  MRN: [id number]    PROBLEM:   Ms. Tacho Lazar was seen for initial consultation in the Department of Radiation Oncology on 11/7/2023 at the request of Dr. Dougherty for new diagnosis of metastatic melanoma.  All available pertinent medical records, radiographic and laboratory studies were reviewed.    HISTORY OF PRESENT ILLNESS:   Tacho Lazar is a 47-year-old woman who presented to her primary care physician in early October 2023 with left knee pain of several months duration which did not improve with physical therapy.  The pain was significant enough to keep her from sleeping.  Additionally, she had symptoms of pronounced fatigue which she thought was related to her son's recent diagnosis of mononucleosis, as well as nausea and vomiting which she attributes to the knee pain.  She was initially evaluated and felt to have an ACL injury.  An MRI scan of the left knee was obtained on 10/20/2023 which demonstrated osseous lesions within the distal femur which were new compared to prior examination on April 3, 2015.  Findings were concerning for malignancy.  She saw Dr. Dougherty and underwent biopsy of the left distal femur on 10/30/2023.    Pathology demonstrated metastatic high-grade malignancy most consistent with melanoma.  IHC stains show positivity for SOX10, S100, Melan-A, HMB45, PRAME and BRAF V600E.  Staining was negative for desmin, DOG1, INSM1, SSX 18 and SF-1 while SATB2 and SSXC show patchy weak reactivity.    She underwent PET CT scan on 11/1/2023 which demonstrated a heterogeneous solid cystic hypermetabolic pelvic mass abutting the uterus and cecum concerning for primary tumor possibly representing an adnexal mass with a smaller mass at the cecum which is likely of similar process.  There were multiple lytic osseous metastatic lesions throughout the axial and appendicular skeleton and in both lung fields suspicious for diffuse  metastasis.  There was a hypermetabolic right renal cortical soft tissue nodule which was indeterminant either representing metastatic disease versus second primary such as renal cell carcinoma.  Within the bones, there were multiple lytic lesions within the vertebral bodies, body of the sternum, left sacral ala, bilateral humeri, bilateral femurs.    She was referred to Radiation Oncology for consideration of palliative radiation to the left femur.    Aside from fatigue and left thigh pain, she also notes frequent urination and has been getting up a few times each night to empty her bladder.  She has not been experiencing any vaginal bleeding in between menstrual cycles.  She has not experienced any unexplained weight loss.  The nausea and vomiting improved when she started taking oxycodone for pain.  She is not having any headaches, confusion, visual changes, imbalance.    PAST MEDICAL HISTORY:   Past Medical History:   Diagnosis Date    Bone cyst of femur     Essential hypertension      PAST SURGICAL HISTORY:   Past Surgical History:   Procedure Laterality Date    BIOPSY BONE LOWER EXTREMITY Left 10/30/2023    Procedure: Biopsy left distal femur;  Surgeon: Jeremie Dougherty MD;  Location: [facility] OR    ENT SURGERY      [place] teeth extracted.     CHEMOTHERAPY HISTORY: None    PAST RADIATION THERAPY HISTORY: None    IMPLANTABLE CARDIAC DEVICE: None    MEDICATIONS:   Current Outpatient Medications   Medication Sig Dispense Refill    acetaminophen (TYLENOL) 500 MG tablet Take 500-1,000 mg by mouth every 6 hours as needed for mild pain      diazepam (VALIUM) 5 MG tablet Do no take until instructed by staff at appointment. 1 tablet 0    ibuprofen (ADVIL/MOTRIN) 200 MG tablet Take 200 mg by mouth every 4 hours as needed for pain      lisinopril-hydrochlorothiazide (ZESTORETIC) 10-12.5 MG tablet Take 1 tablet by mouth every morning      omeprazole 20 MG tablet Take 20 mg by mouth      ondansetron (ZOFRAN ODT) 4 MG ODT  tab Place 4 mg under the tongue every 8 hours as needed      oxyCODONE (ROXICODONE) 5 MG tablet Take 1-2 tablets (5-10 mg) by mouth every 4 hours as needed for moderate to severe pain 12 tablet 0    oxyCODONE (ROXICODONE) 5 MG tablet Take 5 mg by mouth every 6 hours as needed      traZODone (DESYREL) 50 MG tablet Take 50 mg by mouth at bedtime        ALLERGIES:   Allergies as of 2023    (No Known Allergies)     SOCIAL HISTORY:      Social History     Socioeconomic History    Marital status:      Spouse name: Not on file    Number of children: Not on file    Years of education: Not on file    Highest education level: Not on file   Occupational History    Not on file   Tobacco Use    Smoking status: Never    Smokeless tobacco: Never   Substance and Sexual Activity    Alcohol use: Yes     Comment: Social    Drug use: Not Currently    Sexual activity: Not on file   Other Topics Concern    Not on file   Social History Narrative    Not on file     Social Determinants of Health     Financial Resource Strain: Not on file   Food Insecurity: Not on file   Transportation Needs: Not on file   Physical Activity: Not on file   Stress: Not on file   Social Connections: Not on file   Interpersonal Safety: Not on file   Housing Stability: Not on file   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    FAMILY HISTORY: Noncontributory    REVIEW OF SYMPTOMS:  A full 12-point review of systems was performed. All pertinent positives and negatives are noted in HPI.    FUNCTIONAL STATUS: ECO    PHYSICAL EXAMINATION:    LMP 10/30/2023 (Approximate)   Exam:  General: Alert, oriented, in no acute distress  The remainder of physical examination was not performed today.    IMAGING/PATH: Please refer to history of present illness.    LABORATORY:  Lab Results   Component Value Date    WBC 7.9 10/26/2023    HGB 10.2 (L) 10/26/2023    HCT 30.2 (L) 10/26/2023    MCV 90 10/26/2023      (H) 10/26/2023     Lab Results   Component Value Date     (L) 10/26/2023    POTASSIUM 3.3 (L) 10/26/2023    CHLORIDE 90 (L) 10/26/2023    CO2 25 10/26/2023    GLC 95 10/26/2023     Lab Results   Component Value Date    AST 25 10/26/2023    ALT 16 10/26/2023    ALKPHOS 110 (H) 10/26/2023    BILITOTAL 0.4 10/26/2023      ASSESSMENT AND PLAN:   Tacho Lazar is a 47-year-old woman with new diagnosis of possible metastatic melanoma.  Her PET scan shows widely metastatic disease including a large pelvic mass.  I spent some time reviewing her case so far and going over her PET CT scan with the patient and her .  They asked several questions which were answered such that they verbalized understanding of the issues.  They understand the need for further evaluation prior to treatment recommendations.    At this time, her pain is well controlled with oxycodone and palliative radiation can be considered in the near future after further work-up.      Her case was discussed with Gynecologic Oncology for review of the pelvic mass.  The pathology does not have specific tumor markers to rule out a gynecologic origin of this tumor and these will be requested.  We will also plan to discuss her case in Gynecologic Oncology tumor conference tomorrow.  Additionally, she is currently scheduled to see Medical Oncology Dr. Reid on Friday.    Her PET scan shows several lytic lesions within the vertebral bodies with some anterior encroachment on the spinal canal at level T10 and I would recommend MRI of the spine as part of her further evaluation.  Additionally, given her history of nausea and vomiting despite lack of neurologic symptoms, a brain MRI for further staging would help identify any potential brain metastasis.    I refilled her prescription for oxycodone.  We will contact her after discussion in tumor conference tomorrow morning to discuss next steps.  I have urged her to keep her an appointment with Dr. Ried  as well.    We appreciate the opportunity to participate in Ms. Lazar' care.  Please call with any questions.    95 minutes spent by me on the date of the encounter doing chart review, history and exam, documentation and further activities per the note.    Catalina Britt MD  Department of Radiation Oncology  [facility]  Patient Care Team:  Aaliyah Lyon MD as PCP - General (Family Medicine)  Ajit Viveros MD as MD (Hematology & Oncology)  Zeeshan Reid MD as MD (Hematology & Oncology)  JEANNIE MCDANIEL, Sugar FLANAGAN    This note was dictated with voice recognition software and then edited. Please excuse any unintentional errors.

## 2023-11-07 NOTE — PROGRESS NOTES
HPI  INITIAL PATIENT ASSESSMENT    Diagnosis: Cancer    Prior radiation therapy: None    Prior chemotherapy: None    Prior hormonal therapy:No    Pain Eval:  Current history of pain associated with this visit:   Intensity: 6/10  Current: dull  Location: Femur  Treatment: Oxycodone 5 mg    Psychosocial  Living arrangements: Lives with family  Fall Risk: independent   referral needs: Not needed    Advanced Directive: No  Implantable Cardiac Device? No      Review of Systems   Constitutional:  Positive for malaise/fatigue.   HENT: Negative.     Eyes: Negative.    Respiratory: Negative.     Cardiovascular: Negative.    Gastrointestinal:  Positive for constipation, nausea and vomiting.   Genitourinary: Negative.    Musculoskeletal:         Leg pain   Skin: Negative.    Neurological: Negative.    Endo/Heme/Allergies: Negative.    Psychiatric/Behavioral:  The patient is nervous/anxious.

## 2023-11-08 ENCOUNTER — PRE VISIT (OUTPATIENT)
Dept: ONCOLOGY | Facility: CLINIC | Age: 48
End: 2023-11-08
Payer: COMMERCIAL

## 2023-11-08 DIAGNOSIS — C43.9 METASTATIC MALIGNANT MELANOMA (H): Primary | ICD-10-CM

## 2023-11-08 NOTE — PROGRESS NOTES
[facility] Medical Oncology Note       Date of visit: November 10, 2023  New Outpatient Clinic Note        Assessment:     New diagnosis of widespread metastatic malignant melanoma. Unfortunately for Tacho, this is a disease for which we don't have a cure. Therefore, the goals of treatment are to help her live as long as possible, while at the same time have the highest quality of life.  The tumor is positive for BRAF V600E mutation. Standard first line therapy in this setting is ipilimumab and nivolumab based on the just published DREAMseq trial. This trial randomized patients with metastatic disease to either first line ipi/nivo followed by combination targeted therapy with BRAF plus MEK inhibitors, vs the opposite sequence. This gives the longest progression free and overall survival.  It does have substantial risk of side effects, including rash, diarrhea, electrolyte and liver function abnormalities, fatigue, nausea. About oneout of four patients went ff the regimen due to inability to tolerate the side effects. Tacho is 47 and otherwise healthy, so I would like her to get started with ipi/nivo. If she cannot tolerate it, we could consider substituting relatlimab for the ipi, or just giving nivolumab monotherapy.  We did review the NCCN 2023 guidelines for systemic therapy in this setting, which also include the possibility of combination targeted therapy with BRAF plus MEK inhibitors. But given the above trial, Tacho will have the highest chance of being alive 7 years from now with getting started with Ipi/nivo.  Clearly, the idea that a 47-year-old mother of 3 now having a life-threatening disease that carries with it a limited life expectancy for the majority of patients is a very difficult thing to process.  Given the overall survival of 70% and 5 years with our current approach, I am cautiously optimistic that she will do okay.  We will continue to discuss these things at every visit.  My heart goes  out to her, but the regimen as described above should be very helpful for her  .      Plan:     Brain MRI to complete staging  Chemotherapy teaching from [facility] for a ipilimumab and nivolumab  Follow-up with Dr. Meredith's office for evaluation of her biopsy site.  This nonsurgeon does not see any stitches to remove today.  We will begin OxyContin 10 mg p.o. twice daily with oxycodone for breakthrough pain.  This will handle her current analgesic needs.  Return to clinic as soon as possible to get started with treatment.          Zeeshan Reid MD, MSc  Associate Professor of Medicine  [employer] [employer]  [facility]  [address] [place] [address]  [phone]    __________________________________________________________________    CHIEF COMPLAINT/DIAGNOSIS     New dagnosis of widespread metastatic malignant melanoma    History of Present Illness:   Tacho Lazar is a 42 year old woman who saw her PMD this summer for her annual physical with no real concerning medical issues at that time.  She then developed left knee and thigh pain and went to the [place] ED 9/20/2023. US was negative for DVT and MRIs were ordered as out-patient.  MRI of the left knee 10/20/2023 showed a 5 cm osseous lesion in the distal femur. She was referred to Dr. Chance Dougherty and on 10/30/2023 he performed a biopsy. The original read of the biopsy showed a carcinoma of unclear primary site. Final pathology was reviewed and she was presented at tumor conference 11/8/2023. The consensus was that this was mestatic malignanct melanoma.    She is here today to discuss where we go from here regarding management.      Interval history current review of symptoms:     Tacho still has pain in the left proximal leg.  It is a lot better since going on oxycodone.  There is some tenderness at the scar but not a lot.  She still has significant left shoulder pain.  Again the oxycodone has been helpful.  She is  taking roughly 4-5 5 mg tabs daily.  She is also take some ibuprofen.  She is fatigued.  She is still menstruating regularly.  She has no sternal pain despite what we see on scans.  Emotionally this is obviously pretty difficult.  This was all discussed at length.  In terms of having other skin lesions, she did have a mole removed in the left eye 27 years ago.  She was told it was not cancer but it was removed because it was growing.        On ROS in addition to the above  Endorses:    Denies  fevers, chills, NS, HA, dysphagia/odynophagia, change in weight, change in appetite, cough, SOB, CP, n/v, abd pain, constipation/diarrhea, hematochezia, dysuria, hematuria, swelling, rashes, lymphadenopathy      Past Medical History:   I have reviewed this patient's past medical history   Past Medical History:   Diagnosis Date    Bone cyst of femur     Essential hypertension           Past Surgical History:    This patient has no significant past surgical history       Social History:   Tobacco, ETOH, and rec drugs reviewed and as noted below with the following exceptions:  Tacho grew up in [place], [place] and graduated from high school in 1996.  Her soon-to-be  Jai was also in that class, though they were not high school sweetheart's.  She then went to the [employer] where she majored in both business and communications.  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  She has 3 children, Chelsea who is 17, Eli who is 15, and Rd who is 12.  They all are very into sports.  She ingestant met during the second year of college.  They  in the year 2000 after graduation for both.  They currently live in [place].  Jai works for [employer] as a leader of a technology division.  They love live music.  She likes to [employer] as  well.          Family History:     No family history on file.         Medications:     Current Outpatient Medications   Medication Sig Dispense Refill    acetaminophen (TYLENOL) 500 MG tablet Take 500-1,000 mg by mouth every 6 hours as needed for mild pain      diazepam (VALIUM) 5 MG tablet Do no take until instructed by staff at appointment. 1 tablet 0    ibuprofen (ADVIL/MOTRIN) 200 MG tablet Take 200 mg by mouth every 4 hours as needed for pain      lisinopril-hydrochlorothiazide (ZESTORETIC) 10-12.5 MG tablet Take 1 tablet by mouth every morning      omeprazole 20 MG tablet Take 20 mg by mouth      ondansetron (ZOFRAN ODT) 4 MG ODT tab Place 4 mg under the tongue every 8 hours as needed      oxyCODONE (ROXICODONE) 5 MG tablet Take 1-2 tablets (5-10 mg) by mouth every 6 hours as needed for pain 40 tablet 0    oxyCODONE (ROXICODONE) 5 MG tablet Take 1-2 tablets (5-10 mg) by mouth every 4 hours as needed for moderate to severe pain 12 tablet 0    oxyCODONE (ROXICODONE) 5 MG tablet Take 5 mg by mouth every 6 hours as needed      traZODone (DESYREL) 50 MG tablet Take 50 mg by mouth at bedtime                Physical Exam:   Last menstrual period 10/30/2023, not currently breastfeeding.    ECOG PS: 1  Constitutional: WDWN female in NAD, pleasant and appropriate  HEENT:  NC/AT, no icterus, OP clear, MMM  Skin: No jaundice nor ecchymoses  Lungs: CTAB, no w/r/r, nonlabored breathing  Cardiovascular: RRR, S1, S2, no m/r/g  Abdomen: +BS, soft, nontender, nondistended, no organomegaly.  In the suprapubic area just left of midline, there is a 8-10 cm mass noted with minimal palpation.  MSK/Extremities: Warm, well perfused. No edema. Biopsy site shows a well approximated wound with no sign of infection or hemorrhage or erythema.  LN: no cervical, supraclavicular, axillary, nor inguinal lymphadenopathy  Neurologic: alert, answering questions appropriately, moving all extremities spontaneously. CN 2-12 grossly intact.  Psych:  "appropriate affect  Data:     BIOPSY PATH:       Final Diagnosis   A, B.  Bone, left femur tumor, biopsy:  - Metastatic high grade malignancy most consistent with melanoma       The biopsied tissue shows sheets of monotonous epithelioid cells with moderate pale eosinophilic cytoplasm, oval nuclei and inconspicuous nucleoli.  Very high mitotic activity and tumor necrosis are noted.  Immunohistochemical stains show the tumor is positive for SOX10, S100, Melan-A, HMB45, PRAME and BRAF V600E.  Desmin, DOG1, INSM1, SSX18 and SF-1 are negative while SATB2 and SSXC show patchy weak reactivity.  CK AE1/AE3 is weakly positive in rare cells.  INI1 is retained. These results are consistent with and support the above diagnosis.       Recent Labs   Lab Test 10/26/23  1621   WBC 7.9   NEUTROPHIL 72   HGB 10.2*   *     Recent Labs   Lab Test 10/26/23  1621   *   POTASSIUM 3.3*   CHLORIDE 90*   CO2 25   ANIONGAP 18*   BUN 14.1   CR 0.73   JONES 10.1*     No results for input(s): \"MAG\", \"PHOS\", \"LDH\", \"URIC\" in the last 74954 hours.  Recent Labs   Lab Test 10/26/23  1621   BILITOTAL 0.4   ALKPHOS 110*   ALT 16   AST 25   ALBUMIN 4.1     @LABRCNT(PSAtumormarker:7)    No results found for this or any previous visit (from the past 24 hour(s)).    Other Data     CT/PET 11/1/2023      1. Heterogeneous solid cystic hypermetabolic pelvic mass abutting the  uterus and cecum. This is concerning for a primary tumor, possibly an  adnexal mass. There is a smaller mass at the cecum which is likely a  similar process.     2. Multiple lytic osseous and pulmonary metastatic lesions are  throughout the axial and appendicular skeleton and in both lung fields  suspicious for diffuse metastasis. No definite lymphadenopathy seen on  current exam.     3. A hypermetabolic right renal cortical soft tissue nodule is  indeterminate, this may represent metastatic disease versus second  primary such as renal cell carcinoma. Dedicated renal protocol " MRI  pelvis may be helpful for further characterization.            Labs, imaging and treatment plan reviewed with patient. All questions answered.        70 minutes spent on the date of the encounter doing chart review, review of outside records, review of test results, interpretation of tests, patient visit, documentation, discussion with other provider(s), and discussion with family

## 2023-11-09 ENCOUNTER — TUMOR CONFERENCE (OUTPATIENT)
Dept: ONCOLOGY | Facility: CLINIC | Age: 48
End: 2023-11-09
Payer: COMMERCIAL

## 2023-11-09 NOTE — TUMOR CONFERENCE
Gyn Onc Tumor Board Note    Date of Tumor Board Presentation: 11/8/23   Patient: Tacho Lazar   MRN: [id number]  Age: 47 year old  Gyn Onc Staff -N/A, (Dr. Britt from Radiation Oncology)  Disciplines Present: Gynecologic Oncology, Pathology, Radiation Oncology, and Radiology    Oncologic Information  Cancer Type:  Melanoma  Stage: metastatic   Clinical Trial Discussion: No  If yes, what clinical trial should be considered: N/A       Consensus/Management Options:     Referral to med-oncology and recommend starting systemic treatment.       This abstract and interpretation of the conversation at tumor board has been completed by Divya Carmona. These are the general recommendations/discussion provided at tumor board conference. The definitive recommendation/discussion will be made by the primary health care team and patient after full discussion as appropriate.

## 2023-11-10 ENCOUNTER — PATIENT OUTREACH (OUTPATIENT)
Dept: ONCOLOGY | Facility: CLINIC | Age: 48
End: 2023-11-10
Payer: COMMERCIAL

## 2023-11-10 ENCOUNTER — ONCOLOGY VISIT (OUTPATIENT)
Dept: ONCOLOGY | Facility: CLINIC | Age: 48
End: 2023-11-10
Attending: PHYSICIAN ASSISTANT
Payer: COMMERCIAL

## 2023-11-10 VITALS
OXYGEN SATURATION: 100 % | RESPIRATION RATE: 16 BRPM | TEMPERATURE: 98 F | WEIGHT: 181.5 LBS | SYSTOLIC BLOOD PRESSURE: 106 MMHG | BODY MASS INDEX: 32.04 KG/M2 | HEART RATE: 70 BPM | DIASTOLIC BLOOD PRESSURE: 71 MMHG

## 2023-11-10 DIAGNOSIS — C79.51 METASTATIC CARCINOMA INVOLVING BONE WITH UNKNOWN PRIMARY SITE (H): ICD-10-CM

## 2023-11-10 DIAGNOSIS — G89.3 CANCER RELATED PAIN: Primary | ICD-10-CM

## 2023-11-10 DIAGNOSIS — C80.1 METASTATIC CARCINOMA INVOLVING BONE WITH UNKNOWN PRIMARY SITE (H): ICD-10-CM

## 2023-11-10 PROCEDURE — 99213 OFFICE O/P EST LOW 20 MIN: CPT | Performed by: INTERNAL MEDICINE

## 2023-11-10 PROCEDURE — 99205 OFFICE O/P NEW HI 60 MIN: CPT | Performed by: INTERNAL MEDICINE

## 2023-11-10 RX ORDER — OXYCODONE HCL 10 MG/1
10 TABLET, FILM COATED, EXTENDED RELEASE ORAL EVERY 12 HOURS
Qty: 60 TABLET | Refills: 0 | Status: SHIPPED | OUTPATIENT
Start: 2023-11-10 | End: 2023-12-10

## 2023-11-10 ASSESSMENT — PAIN SCALES - GENERAL: PAINLEVEL: MILD PAIN (3)

## 2023-11-10 NOTE — PROGRESS NOTES
[facility]: Cancer Care Initial Note                                    Discussion with Patient:                                                      Introduced self and role of RN Care Coordinator at [facility].  Provided my contact information, [facility] phone number (which has options to talk with a Nurse available 24/7 - triage and RNCC via this option during business hours).     Reviewed current clinic flow including telemedicine visits and RNCCs working remotely at varying intervals.  Reviewed appropriate use of mychart and reinforced to not send symptoms through MyChart.      Reviewed [facility] care team members including midlevel providers in oncology dept and Dr. Reid's usual clinic hours.    Patient voiced understanding and appreciation of above information and denies any further questions, and patient understands that I will follow and provide coordination as needed.    Met with patient to discuss Ipi/Nivo patient education and resources available at the [facility].  Provided patient/sent patient MyChart message with [facility] patient education on Ipi/Nivo.  Reviewed administration, side effects (including myelosuppression, nausea/vomiting, diarrhea/constipation, hair loss, mouth sores) and ongoing symptom management by APPs in clinic. Provided phone numbers for triage and after hours care and when to call clinic. Highlighted steps to expect when getting treatment (check in, labs, pre- meds, infusion).  Discussed that treatment may be delayed a day or two due to blood counts, infusion schedule, side effects, medications or patient's need to modify.       Goals          General     Other (pt-stated)      Notes - Note created  11/10/2023 11:33 AM by Deysi Reyes, RN     Goal Statement: I will use my clinic and care team resources as directed.  Date Goal set: 11/10/2023  Barriers: No barriers identified  Strengths: support, coping, motivation, health awareness,  and involvement with care team  Date to Achieve By: ongoing  Patient expressed understanding of goal: Yes  Action steps to achieve this goal:  I will contact triage with new, worsening or uncontrolled symptoms.   I will contact triage with temperature over 100.4  I will call with difficulties of scheduling and/or transportation.   I will request needed refills when there are 7 days of medication remaining.   I will not send urgent or symptomatic messages through [employer].   I will contact scheduling to arrange or make changes in my appointments.   Patient will contact clinic and RNCC as needed ongoing.                Assessment:                                                      Initial  Current living arrangement:: I live in a private home with family  Type of residence:: Private home - stairs  Informal Support system:: Family  Equipment Currently Used at Home: none  Bed or wheelchair confined:: No  Mobility Status: Independent  Transportation means:: Regular car  Medication adherence problem (GOAL):: No  Knowledgeable about how to use meds:: Yes  Medication side effects suspected:: No  Referrals Placed: None    Plan of Care Education Review:   Assessment completed with:: Patient;Family    Plan of Care Education   Yearly learning assessment completed?: Yes (see Education tab)  Diagnosis:: Melanoma  Does patient understand diagnosis?: Yes  Tx plan/regimen:: Ipi/Nivo  Does patient understand treatment plan/regimen?: Yes  Preparing for treatment:: Reviewed treatment preparation information with patient (vascular access, day of chemo, visitor policy, what to bring, etc.)  Vascular access education provided for:: Port;PICC;Peripheral IV;Acosta catheter;Other (comment)  Side effect education:: Diarrhea/Constipation;Fatigue;Immune-mediated effects;Infection;Lab value monitoring (anemia, neutropenia, thrombocytopenia);Mouth sores;Nausea/Vomiting  Safety/self care at home reviewed with patient:: No  Coping -  concerns/fears reviewed with patient:: Yes  Plan of Care:: PHILIP follow-up appointment;Lab appointment;Imaging;MD follow-up appointment;Treatment schedule  When to call provider:: Bleeding;Increased shortness of breath;New/worsening pain;Shaking chills;Temperature >100.4F;Uncontrolled diarrhea/constipation;Uncontrolled nausea/vomiting    Evaluation of Learning  Patient Education Provided: Yes  Readiness:: Acceptance  Method:: Literature;Explanation  Response:: Verbalizes understanding           Intervention/Education provided during outreach:                                                       Patient to follow up as scheduled at next appt  Patient to call/AMResortst message with updates  Confirmed patient has clinic and triage numbers    Deysi Reyes RN  Cancer Care Coordinator  [facility]

## 2023-11-10 NOTE — NURSING NOTE
"Oncology Rooming Note    November 10, 2023 9:38 AM   Tacho Lazar is a 47 year old female who presents for:    Chief Complaint   Patient presents with    Oncology Clinic Visit     Metastatic carcinoma involving bone with unknown primary site     Initial Vitals: /71 (BP Location: Right arm, Patient Position: Sitting, Cuff Size: Adult Regular)   Pulse 70   Temp 98  F (36.7  C) (Oral)   Resp 16   Wt 82.3 kg (181 lb 8 oz)   LMP 10/30/2023 (Approximate)   SpO2 100%   BMI 32.04 kg/m   Estimated body mass index is 32.04 kg/m  as calculated from the following:    Height as of 10/30/23: 1.603 m (5' 3.11\").    Weight as of this encounter: 82.3 kg (181 lb 8 oz). Body surface area is 1.91 meters squared.  Mild Pain (3) Comment: constant   Patient's last menstrual period was 10/30/2023 (approximate).  Allergies reviewed: Yes  Medications reviewed: Yes    Medications: Medication refills not needed today.  Pharmacy name entered into [facility]:    [id number] [place] [address] [id number] - [place] [address]    Clinical concerns: Pt is experiencing mild pain in their left thigh (where biopsy was performed) and left shoulder. Pain is constant and rated a 3 after taking pain meds. Before meds pain would be 8 or 9.       Farideh Handley"

## 2023-11-10 NOTE — LETTER
11/10/2023         RE: Tacho Lazar  [address]  [facility] [address]        Dear Colleague,    Thank you for referring your patient, Tacho Lazar, to the [facility]. Please see a copy of my visit note below.      [facility] Medical Oncology Note       Date of visit: November 10, 2023  New Outpatient Clinic Note        Assessment:     New diagnosis of widespread metastatic malignant melanoma. Unfortunately for Tacho, this is a disease for which we don't have a cure. Therefore, the goals of treatment are to help her live as long as possible, while at the same time have the highest quality of life.  The tumor is positive for BRAF V600E mutation. Standard first line therapy in this setting is ipilimumab and nivolumab based on the just published DREAMseq trial. This trial randomized patients with metastatic disease to either first line ipi/nivo followed by combination targeted therapy with BRAF plus MEK inhibitors, vs the opposite sequence. This gives the longest progression free and overall survival.  It does have substantial risk of side effects, including rash, diarrhea, electrolyte and liver function abnormalities, fatigue, nausea. About oneout of four patients went ff the regimen due to inability to tolerate the side effects. Tacho is 47 and otherwise healthy, so I would like her to get started with ipi/nivo. If she cannot tolerate it, we could consider substituting relatlimab for the ipi, or just giving nivolumab monotherapy.  We did review the NCCN 2023 guidelines for systemic therapy in this setting, which also include the possibility of combination targeted therapy with BRAF plus MEK inhibitors. But given the above trial, Tacho will have the highest chance of being alive 7 years from now with getting started with Ipi/nivo.  Clearly, the idea that a 47-year-old mother of 3 now having a life-threatening disease that carries with it a limited life expectancy for the  majority of patients is a very difficult thing to process.  Given the overall survival of 70% and 5 years with our current approach, I am cautiously optimistic that she will do okay.  We will continue to discuss these things at every visit.  My heart goes out to her, but the regimen as described above should be very helpful for her  .      Plan:     Brain MRI to complete staging  Chemotherapy teaching from [facility] for a ipilimumab and nivolumab  Follow-up with Dr. Meredith's office for evaluation of her biopsy site.  This nonsurgeon does not see any stitches to remove today.  We will begin OxyContin 10 mg p.o. twice daily with oxycodone for breakthrough pain.  This will handle her current analgesic needs.  Return to clinic as soon as possible to get started with treatment.          Zeeshan Reid MD, MSc  Associate Professor of Medicine  [facility] [place] [address]  [phone]    __________________________________________________________________    CHIEF COMPLAINT/DIAGNOSIS     New dagnosis of widespread metastatic malignant melanoma    History of Present Illness:   Tacho Lazar is a 42 year old woman who saw her PMD this summer for her annual physical with no real concerning medical issues at that time.  She then developed left knee and thigh pain and went to the [facility] ED 9/20/2023. US was negative for DVT and MRIs were ordered as out-patient.  MRI of the left knee 10/20/2023 showed a 5 cm osseous lesion in the distal femur. She was referred to Dr. Chance Dougherty and on 10/30/2023 he performed a biopsy. The original read of the biopsy showed a carcinoma of unclear primary site. Final pathology was reviewed and she was presented at tumor conference 11/8/2023. The consensus was that this was mestatic malignanct melanoma.    She is here today to discuss where we go from here regarding management.      Interval history current review  of symptoms:     Tacho still has pain in the left proximal leg.  It is a lot better since going on oxycodone.  There is some tenderness at the scar but not a lot.  She still has significant left shoulder pain.  Again the oxycodone has been helpful.  She is taking roughly 4-5 5 mg tabs daily.  She is also take some ibuprofen.  She is fatigued.  She is still menstruating regularly.  She has no sternal pain despite what we see on scans.  Emotionally this is obviously pretty difficult.  This was all discussed at length.  In terms of having other skin lesions, she did have a mole removed in the left eye 27 years ago.  She was told it was not cancer but it was removed because it was growing.        On ROS in addition to the above  Endorses:    Denies  fevers, chills, NS, HA, dysphagia/odynophagia, change in weight, change in appetite, cough, SOB, CP, n/v, abd pain, constipation/diarrhea, hematochezia, dysuria, hematuria, swelling, rashes, lymphadenopathy      Past Medical History:   I have reviewed this patient's past medical history   Past Medical History:   Diagnosis Date    Bone cyst of femur     Essential hypertension           Past Surgical History:    This patient has no significant past surgical history       Social History:   Tobacco, ETOH, and rec drugs reviewed and as noted below with the following exceptions:  Tacho grew up in [place], [place] and graduated from high school in 1996.  Her soon-to-be  Jai was also in that class, though they were not high school sweetheart's.  She then went to the [employer] where she majored in both business and communications.  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  She has 3 children, Chelsea who is 17, Eli who is 15, and Rd who is 12.  They all are very into sports.  She ingestant met  during the second year of college.  They  in the year 2000 after graduation for both.  They currently live in [place].  Jai works for [employer] as a leader of a technology division.  They love live music.  She likes to [place] as well.          Family History:     No family history on file.         Medications:     Current Outpatient Medications   Medication Sig Dispense Refill    acetaminophen (TYLENOL) 500 MG tablet Take 500-1,000 mg by mouth every 6 hours as needed for mild pain      diazepam (VALIUM) 5 MG tablet Do no take until instructed by staff at appointment. 1 tablet 0    ibuprofen (ADVIL/MOTRIN) 200 MG tablet Take 200 mg by mouth every 4 hours as needed for pain      lisinopril-hydrochlorothiazide (ZESTORETIC) 10-12.5 MG tablet Take 1 tablet by mouth every morning      omeprazole 20 MG tablet Take 20 mg by mouth      ondansetron (ZOFRAN ODT) 4 MG ODT tab Place 4 mg under the tongue every 8 hours as needed      oxyCODONE (ROXICODONE) 5 MG tablet Take 1-2 tablets (5-10 mg) by mouth every 6 hours as needed for pain 40 tablet 0    oxyCODONE (ROXICODONE) 5 MG tablet Take 1-2 tablets (5-10 mg) by mouth every 4 hours as needed for moderate to severe pain 12 tablet 0    oxyCODONE (ROXICODONE) 5 MG tablet Take 5 mg by mouth every 6 hours as needed      traZODone (DESYREL) 50 MG tablet Take 50 mg by mouth at bedtime                Physical Exam:   Last menstrual period 10/30/2023, not currently breastfeeding.    ECOG PS: 1  Constitutional: WDWN female in NAD, pleasant and appropriate  HEENT:  NC/AT, no icterus, OP clear, MMM  Skin: No jaundice nor ecchymoses  Lungs: CTAB, no w/r/r, nonlabored breathing  Cardiovascular: RRR, S1, S2, no m/r/g  Abdomen: +BS, soft, nontender, nondistended, no organomegaly.  In the suprapubic area just left of midline, there is a 8-10 cm mass noted with minimal palpation.  MSK/Extremities: Warm, well perfused. No edema. Biopsy site shows a well approximated wound with no  "sign of infection or hemorrhage or erythema.  LN: no cervical, supraclavicular, axillary, nor inguinal lymphadenopathy  Neurologic: alert, answering questions appropriately, moving all extremities spontaneously. CN 2-12 grossly intact.  Psych: appropriate affect  Data:     BIOPSY PATH:       Final Diagnosis   A, B.  Bone, left femur tumor, biopsy:  - Metastatic high grade malignancy most consistent with melanoma       The biopsied tissue shows sheets of monotonous epithelioid cells with moderate pale eosinophilic cytoplasm, oval nuclei and inconspicuous nucleoli.  Very high mitotic activity and tumor necrosis are noted.  Immunohistochemical stains show the tumor is positive for SOX10, S100, Melan-A, HMB45, PRAME and BRAF V600E.  Desmin, DOG1, INSM1, SSX18 and SF-1 are negative while SATB2 and SSXC show patchy weak reactivity.  CK AE1/AE3 is weakly positive in rare cells.  INI1 is retained. These results are consistent with and support the above diagnosis.       Recent Labs   Lab Test 10/26/23  1621   WBC 7.9   NEUTROPHIL 72   HGB 10.2*   *     Recent Labs   Lab Test 10/26/23  1621   *   POTASSIUM 3.3*   CHLORIDE 90*   CO2 25   ANIONGAP 18*   BUN 14.1   CR 0.73   JONES 10.1*     No results for input(s): \"MAG\", \"PHOS\", \"LDH\", \"URIC\" in the last 41000 hours.  Recent Labs   Lab Test 10/26/23  1621   BILITOTAL 0.4   ALKPHOS 110*   ALT 16   AST 25   ALBUMIN 4.1     @LABRCNT(PSAtumormarker:7)    No results found for this or any previous visit (from the past 24 hour(s)).    Other Data     CT/PET 11/1/2023      1. Heterogeneous solid cystic hypermetabolic pelvic mass abutting the  uterus and cecum. This is concerning for a primary tumor, possibly an  adnexal mass. There is a smaller mass at the cecum which is likely a  similar process.     2. Multiple lytic osseous and pulmonary metastatic lesions are  throughout the axial and appendicular skeleton and in both lung fields  suspicious for diffuse metastasis. No " definite lymphadenopathy seen on  current exam.     3. A hypermetabolic right renal cortical soft tissue nodule is  indeterminate, this may represent metastatic disease versus second  primary such as renal cell carcinoma. Dedicated renal protocol MRI  pelvis may be helpful for further characterization.            Labs, imaging and treatment plan reviewed with patient. All questions answered.        70 minutes spent on the date of the encounter doing chart review, review of outside records, review of test results, interpretation of tests, patient visit, documentation, discussion with other provider(s), and discussion with family

## 2023-11-14 ENCOUNTER — TELEPHONE (OUTPATIENT)
Dept: ONCOLOGY | Facility: CLINIC | Age: 48
End: 2023-11-14
Payer: COMMERCIAL

## 2023-11-15 NOTE — TELEPHONE ENCOUNTER
Central Prior Authorization Team   Phone: [phone]    PA Initiation    Medication: OXYCONTIN 10 MG PO T12A  Insurance Company: [employer] ([facility]) - Phone [phone] Fax [phone]  Pharmacy Filling the Rx: [id number] [place] [address]  Filling Pharmacy Phone: [phone]  Filling Pharmacy Fax:    Start Date: 11/15/2023

## 2023-11-15 NOTE — TELEPHONE ENCOUNTER
Fax received from [facility]    Prior auth need for OxyContin 10mg ER to be used to cancer related pain    COVER MY MEDS KEY on fax:    [id number]    Payer Info:  Carrier:  [facility]  Plan Name: [facility]  Phone  [phone]  ID# [id number]  [facility]  [id number]  [facility] [address]

## 2023-11-16 NOTE — TELEPHONE ENCOUNTER
PRIOR AUTHORIZATION DENIED    Medication: OXYCONTIN 10 MG PO T12A  Insurance Company: [employer] ([facility]) - Phone [phone] Fax [phone]  Denial Date: 10/15/2023  Denial Rational: Excluded from coverage        Appeal Information:

## 2023-11-17 DIAGNOSIS — M85.60 BONE CYST: ICD-10-CM

## 2023-11-17 RX ORDER — OXYCODONE HYDROCHLORIDE 5 MG/1
5-10 TABLET ORAL EVERY 4 HOURS PRN
Qty: 90 TABLET | Refills: 0 | Status: SHIPPED | OUTPATIENT
Start: 2023-11-17

## 2023-11-27 LAB — BACTERIA BONE ANAEROBE+AEROBE CULT: NO GROWTH

## 2023-12-31 ENCOUNTER — HEALTH MAINTENANCE LETTER (OUTPATIENT)
Age: 48
End: 2023-12-31

## (undated) DEVICE — PREP CHLORAPREP 26ML TINTED HI-LITE ORANGE 930815

## (undated) DEVICE — CAST PADDING 6" STERILE 9046S

## (undated) DEVICE — BASIN SET MAJOR

## (undated) DEVICE — CAST PADDING 4" STERILE 9044S

## (undated) DEVICE — BNDG ELASTIC 6"X5YDS STERILE 6611-6S

## (undated) DEVICE — STRAP KNEE/BODY 31143004

## (undated) DEVICE — GLOVE BIOGEL PI ULTRATOUCH G SZ 7.5 42175

## (undated) DEVICE — DRAPE C-ARMOR 5 SIDED 5523

## (undated) DEVICE — LINEN BACK PACK 5440

## (undated) DEVICE — LINEN ORTHO PACK 5446

## (undated) DEVICE — SUCTION MANIFOLD NEPTUNE 2 SYS 4 PORT 0702-020-000

## (undated) DEVICE — GLOVE BIOGEL PI MICRO INDICATOR UNDERGLOVE SZ 8.0 48980

## (undated) DEVICE — SOL WATER IRRIG 1000ML BOTTLE 2F7114

## (undated) DEVICE — DRSG TELFA 3X8" 1238

## (undated) DEVICE — LINEN GOWN X4 5410

## (undated) DEVICE — ESU GROUND PAD UNIVERSAL W/O CORD

## (undated) DEVICE — SUCTION TIP YANKAUER STR K87

## (undated) DEVICE — LIGHT HANDLE X2

## (undated) DEVICE — LINEN TOWEL PACK X5 5464

## (undated) DEVICE — SUCTION TIP YANKAUER W/O VENT K86

## (undated) DEVICE — DRSG STERI STRIP 1/2X4" R1547

## (undated) DEVICE — PACK LOWER EXTREMITY RIVERSIDE SOP32LEFSX

## (undated) DEVICE — SU PDS II 3-0 PS-2 18" Z497G

## (undated) DEVICE — SU VICRYL 2-0 CT-1 27" UND J259H

## (undated) DEVICE — SOL NACL 0.9% IRRIG 1000ML BOTTLE 2F7124

## (undated) DEVICE — ESU PENCIL W/SMOKE EVAC NEPTUNE STRYKER 0703-046-000

## (undated) DEVICE — DRAPE CONVERTORS U-DRAPE 60X72" 8476

## (undated) DEVICE — DRAPE STOCKINETTE IMPERVIOUS 12" 1587

## (undated) DEVICE — SU DERMABOND ADVANCED .7ML DNX12

## (undated) RX ORDER — CEFAZOLIN SODIUM/WATER 2 G/20 ML
SYRINGE (ML) INTRAVENOUS
Status: DISPENSED
Start: 2023-10-30

## (undated) RX ORDER — PROPOFOL 10 MG/ML
INJECTION, EMULSION INTRAVENOUS
Status: DISPENSED
Start: 2023-10-30

## (undated) RX ORDER — ONDANSETRON 2 MG/ML
INJECTION INTRAMUSCULAR; INTRAVENOUS
Status: DISPENSED
Start: 2023-10-30

## (undated) RX ORDER — FENTANYL CITRATE 50 UG/ML
INJECTION, SOLUTION INTRAMUSCULAR; INTRAVENOUS
Status: DISPENSED
Start: 2023-10-30

## (undated) RX ORDER — KETOROLAC TROMETHAMINE 30 MG/ML
INJECTION, SOLUTION INTRAMUSCULAR; INTRAVENOUS
Status: DISPENSED
Start: 2023-10-30

## (undated) RX ORDER — ACETAMINOPHEN 325 MG/1
TABLET ORAL
Status: DISPENSED
Start: 2023-10-30

## (undated) RX ORDER — DEXAMETHASONE SODIUM PHOSPHATE 4 MG/ML
INJECTION, SOLUTION INTRA-ARTICULAR; INTRALESIONAL; INTRAMUSCULAR; INTRAVENOUS; SOFT TISSUE
Status: DISPENSED
Start: 2023-10-30

## (undated) RX ORDER — FENTANYL CITRATE 0.05 MG/ML
INJECTION, SOLUTION INTRAMUSCULAR; INTRAVENOUS
Status: DISPENSED
Start: 2023-10-30